# Patient Record
Sex: MALE | Race: WHITE | NOT HISPANIC OR LATINO | Employment: FULL TIME | URBAN - METROPOLITAN AREA
[De-identification: names, ages, dates, MRNs, and addresses within clinical notes are randomized per-mention and may not be internally consistent; named-entity substitution may affect disease eponyms.]

---

## 2017-06-14 ENCOUNTER — CLINICAL SUPPORT (OUTPATIENT)
Dept: AUDIOLOGY | Facility: CLINIC | Age: 67
End: 2017-06-14
Payer: COMMERCIAL

## 2017-06-14 ENCOUNTER — OFFICE VISIT (OUTPATIENT)
Dept: OTOLARYNGOLOGY | Facility: CLINIC | Age: 67
End: 2017-06-14
Payer: COMMERCIAL

## 2017-06-14 VITALS — WEIGHT: 169.75 LBS | HEIGHT: 69 IN | BODY MASS INDEX: 25.14 KG/M2

## 2017-06-14 DIAGNOSIS — H93.3X2: Primary | ICD-10-CM

## 2017-06-14 PROCEDURE — 99205 OFFICE O/P NEW HI 60 MIN: CPT | Mod: S$GLB,,, | Performed by: OTOLARYNGOLOGY

## 2017-06-14 PROCEDURE — 92557 COMPREHENSIVE HEARING TEST: CPT | Mod: S$GLB,,, | Performed by: AUDIOLOGIST

## 2017-06-14 PROCEDURE — 99999 PR PBB SHADOW E&M-NEW PATIENT-LVL I: CPT | Mod: PBBFAC,,,

## 2017-06-14 PROCEDURE — 99999 PR PBB SHADOW E&M-EST. PATIENT-LVL III: CPT | Mod: PBBFAC,,, | Performed by: OTOLARYNGOLOGY

## 2017-06-14 NOTE — LETTER
June 14, 2017      Onofre Willis MD  [address] [facility] [address]  [facility] [address]           Ajay kailey - Otorhinolaryngology  [address]  [facility] [address]  Phone: [phone]  Fax: [phone]          Patient: Zka Larry   MR Number: [id number]   YOB: 1950   Date of Visit: 6/14/2017       Dear Dr. Onofre Willis:    Thank you for referring Zak Larry to me for evaluation. Attached you will find relevant portions of my assessment and plan of care.    If you have questions, please do not hesitate to call me. I look forward to following Zak Larry along with you.    Sincerely,    Juan M Pride MD    Enclosure  CC:  No Recipients    If you would like to receive this communication electronically, please contact [email or web address] or [phone] to request more information on [employer] access.    For providers and/or their staff who would like to refer a patient to [facility], please contact us through our one-stop-shop provider referral line, [facility], at [phone].    If you feel you have received this communication in error or would no longer like to receive these types of communications, please e-mail [email or web address]

## 2017-06-14 NOTE — PROGRESS NOTES
Subjective:       Patient ID: Zak Larry is a 67 y.o. male.    Chief Complaint: Acoustic Neuroma    66 yo male who has had left sided hearing loss for a long time that has gradually worsened over time. He went another neurootologist and was found to have an acoustic neuroma on the left side 2.5 x2.2x2.2 cm on the brainstem that has widened the IAC. Pt has no facial nerve symptoms and this is his greatest concern. His MRI will not open at this time      Hearing Loss:   Chronicity:  Chronic  Onset:  More than 1 year ago  Progression since onset:  Gradually worsening  Severity:  Moderate  Hearing loss characteristics:  Muffled, trouble hearing TV, difficult on telephone, impaired select discrimination and severe   Associated symptoms: tinnitus.  No dizziness, no vertigo, no ear pain and no headaches.  Aggravated by:  Nothing  Risk factors for lung disease:  Family history of deafness  Treatments tried:  NothingNo dizziness.    Review of Systems   Constitutional: Negative.    HENT: Positive for hearing loss and tinnitus. Negative for ear discharge and ear pain.    Eyes: Negative for visual disturbance.   Respiratory: Negative for shortness of breath.    Cardiovascular: Negative for chest pain.   Gastrointestinal: Negative.    Endocrine: Negative.    Genitourinary: Negative.    Musculoskeletal: Negative.    Skin: Negative.    Allergic/Immunologic: Negative.    Neurological: Negative for dizziness, vertigo and headaches.   Hematological: Negative.    Psychiatric/Behavioral: Negative.        Past Medical History:   Diagnosis Date    Cancer     SKIN    Hearing loss        Past Surgical History:   Procedure Laterality Date    BONE SPUR REMOVAL Left 2005    FOOT    HEMORRHOID SURGERY      TONSILLECTOMY         Objective:      Physical Exam   Constitutional: He is oriented to person, place, and time. Vital signs are normal. He appears well-developed and well-nourished.  Non-toxic appearance. He does not have a  sickly appearance. No distress.   HENT:   Head: Normocephalic and atraumatic.       Right Ear: Tympanic membrane, external ear and ear canal normal. No tenderness. Tympanic membrane is not injected. Decreased hearing is noted.   Left Ear: Tympanic membrane, external ear and ear canal normal. No tenderness. Tympanic membrane is not injected. Decreased hearing is noted.   Nose: No mucosal edema, rhinorrhea or septal deviation.   Eyes: EOM and lids are normal. Pupils are equal, round, and reactive to light.   Neck: Normal range of motion. No thyroid mass present.   Cardiovascular: Normal rate, regular rhythm and normal heart sounds.    Pulmonary/Chest: Effort normal and breath sounds normal.   Lymphadenopathy:     He has no cervical adenopathy.   Neurological: He is alert and oriented to person, place, and time.   Skin: Skin is warm and dry.   Nursing note and vitals reviewed.            Assessment:       1. Acoustic neuroma syndrome, left        Plan:       Zak was seen today for acoustic neuroma.    Diagnoses and all orders for this visit:    Acoustic neuroma syndrome, left  -     Ambulatory referral to Audiology      Will attempt to open MRI with radiology, if not will need report versus new scan in a few months  Discussed options of surgery, radiosurgery and close monitoring.   Will f/u with patient after MRI reviewed

## 2017-10-03 ENCOUNTER — HISTORICAL (OUTPATIENT)
Dept: ADMINISTRATIVE | Facility: HOSPITAL | Age: 67
End: 2017-10-03

## 2017-10-03 LAB
ABS NEUT (OLG): 3.51 X10(3)/MCL (ref 2.1–9.2)
ALBUMIN SERPL-MCNC: 4 GM/DL (ref 3.4–5)
ALBUMIN/GLOB SERPL: 1.2 RATIO (ref 1.1–2)
ALP SERPL-CCNC: 86 UNIT/L (ref 50–136)
ALT SERPL-CCNC: 30 UNIT/L (ref 12–78)
AST SERPL-CCNC: 27 UNIT/L (ref 15–37)
BASOPHILS # BLD AUTO: 0 X10(3)/MCL (ref 0–0.2)
BASOPHILS NFR BLD AUTO: 1 %
BILIRUB SERPL-MCNC: 0.7 MG/DL (ref 0.2–1)
BILIRUBIN DIRECT+TOT PNL SERPL-MCNC: 0.2 MG/DL (ref 0–0.5)
BILIRUBIN DIRECT+TOT PNL SERPL-MCNC: 0.5 MG/DL (ref 0–0.8)
BUN SERPL-MCNC: 17 MG/DL (ref 7–18)
CALCIUM SERPL-MCNC: 9.2 MG/DL (ref 8.5–10.1)
CHLORIDE SERPL-SCNC: 104 MMOL/L (ref 98–107)
CO2 SERPL-SCNC: 31 MMOL/L (ref 21–32)
CREAT SERPL-MCNC: 0.83 MG/DL (ref 0.7–1.3)
EOSINOPHIL # BLD AUTO: 0.1 X10(3)/MCL (ref 0–0.9)
EOSINOPHIL NFR BLD AUTO: 2 %
ERYTHROCYTE [DISTWIDTH] IN BLOOD BY AUTOMATED COUNT: 12.1 % (ref 11.5–17)
GLOBULIN SER-MCNC: 3.2 GM/DL (ref 2.4–3.5)
GLUCOSE SERPL-MCNC: 100 MG/DL (ref 74–106)
HCT VFR BLD AUTO: 43.5 % (ref 42–52)
HGB BLD-MCNC: 14.6 GM/DL (ref 14–18)
LYMPHOCYTES # BLD AUTO: 1.9 X10(3)/MCL (ref 0.6–4.6)
LYMPHOCYTES NFR BLD AUTO: 31 %
MCH RBC QN AUTO: 30.4 PG (ref 27–31)
MCHC RBC AUTO-ENTMCNC: 33.6 GM/DL (ref 33–36)
MCV RBC AUTO: 90.6 FL (ref 80–94)
MONOCYTES # BLD AUTO: 0.5 X10(3)/MCL (ref 0.1–1.3)
MONOCYTES NFR BLD AUTO: 8 %
NEUTROPHILS # BLD AUTO: 3.51 X10(3)/MCL (ref 2.1–9.2)
NEUTROPHILS NFR BLD AUTO: 58 %
PLATELET # BLD AUTO: 193 X10(3)/MCL (ref 130–400)
PMV BLD AUTO: 9.9 FL (ref 9.4–12.4)
POTASSIUM SERPL-SCNC: 4.5 MMOL/L (ref 3.5–5.1)
PROT SERPL-MCNC: 7.2 GM/DL (ref 6.4–8.2)
RBC # BLD AUTO: 4.8 X10(6)/MCL (ref 4.7–6.1)
SODIUM SERPL-SCNC: 140 MMOL/L (ref 136–145)
WBC # SPEC AUTO: 6.1 X10(3)/MCL (ref 4.5–11.5)

## 2020-06-18 ENCOUNTER — HISTORICAL (OUTPATIENT)
Dept: ADMINISTRATIVE | Facility: HOSPITAL | Age: 70
End: 2020-06-18

## 2021-06-30 PROBLEM — E03.9 HYPOTHYROIDISM: Status: ACTIVE | Noted: 2021-06-30

## 2021-06-30 PROBLEM — E78.5 DYSLIPIDEMIA: Status: ACTIVE | Noted: 2021-06-30

## 2021-07-23 PROBLEM — I08.0 MITRAL VALVE INSUFFICIENCY AND AORTIC VALVE INSUFFICIENCY: Status: ACTIVE | Noted: 2021-07-23

## 2021-07-23 PROBLEM — I65.23 BILATERAL CAROTID ARTERY STENOSIS: Status: ACTIVE | Noted: 2021-07-23

## 2021-07-23 PROBLEM — Z79.899 ENCOUNTER FOR LONG-TERM (CURRENT) USE OF OTHER MEDICATIONS: Status: ACTIVE | Noted: 2021-07-23

## 2021-08-11 PROBLEM — E53.8 FOLATE DEFICIENCY: Status: ACTIVE | Noted: 2021-08-11

## 2021-09-24 DIAGNOSIS — K40.90 NON-RECURRENT UNILATERAL INGUINAL HERNIA WITHOUT OBSTRUCTION OR GANGRENE: Primary | ICD-10-CM

## 2021-09-24 RX ORDER — LIDOCAINE HYDROCHLORIDE 10 MG/ML
1 INJECTION, SOLUTION EPIDURAL; INFILTRATION; INTRACAUDAL; PERINEURAL ONCE
Status: CANCELLED | OUTPATIENT
Start: 2021-09-24 | End: 2021-09-24

## 2021-09-24 RX ORDER — SODIUM CHLORIDE 0.9 % (FLUSH) 0.9 %
10 SYRINGE (ML) INJECTION
Status: CANCELLED | OUTPATIENT
Start: 2021-09-24

## 2021-09-24 RX ORDER — SODIUM CHLORIDE 9 MG/ML
INJECTION, SOLUTION INTRAVENOUS CONTINUOUS
Status: CANCELLED | OUTPATIENT
Start: 2021-10-04

## 2021-09-24 RX ORDER — CEFAZOLIN SODIUM 2 G/50ML
2 SOLUTION INTRAVENOUS
Status: CANCELLED | OUTPATIENT
Start: 2021-10-04

## 2021-09-29 ENCOUNTER — HOSPITAL ENCOUNTER (OUTPATIENT)
Dept: PREADMISSION TESTING | Facility: HOSPITAL | Age: 71
Discharge: HOME | End: 2021-09-29
Attending: SURGERY
Payer: COMMERCIAL

## 2021-09-29 VITALS — HEIGHT: 69 IN | WEIGHT: 155 LBS | BODY MASS INDEX: 22.96 KG/M2

## 2021-09-29 RX ORDER — ASPIRIN 81 MG/1
81 TABLET ORAL DAILY
COMMUNITY

## 2021-09-29 RX ORDER — OMEPRAZOLE 40 MG/1
40 CAPSULE, DELAYED RELEASE ORAL DAILY
COMMUNITY

## 2021-09-30 ENCOUNTER — ANESTHESIA EVENT (OUTPATIENT)
Dept: SURGERY | Facility: HOSPITAL | Age: 71
End: 2021-09-30
Payer: COMMERCIAL

## 2021-10-01 ENCOUNTER — HOSPITAL ENCOUNTER (OUTPATIENT)
Dept: PREADMISSION TESTING | Facility: HOSPITAL | Age: 71
Discharge: HOME | End: 2021-10-01
Attending: SURGERY
Payer: COMMERCIAL

## 2021-10-01 DIAGNOSIS — Z01.818 PRE-OP TESTING: ICD-10-CM

## 2021-10-01 DIAGNOSIS — K40.90 NON-RECURRENT UNILATERAL INGUINAL HERNIA WITHOUT OBSTRUCTION OR GANGRENE: ICD-10-CM

## 2021-10-01 LAB — SARS-COV-2 RNA RESP QL NAA+PROBE: NOT DETECTED

## 2021-10-01 PROCEDURE — U0002 COVID-19 LAB TEST NON-CDC: HCPCS | Performed by: SURGERY

## 2021-10-03 PROBLEM — K40.90 INGUINAL HERNIA: Chronic | Status: ACTIVE | Noted: 2021-09-01

## 2021-10-04 ENCOUNTER — HOSPITAL ENCOUNTER (OUTPATIENT)
Facility: HOSPITAL | Age: 71
Discharge: HOME | End: 2021-10-04
Attending: SURGERY | Admitting: SURGERY
Payer: COMMERCIAL

## 2021-10-04 ENCOUNTER — ANESTHESIA (OUTPATIENT)
Dept: SURGERY | Facility: HOSPITAL | Age: 71
End: 2021-10-04
Payer: COMMERCIAL

## 2021-10-04 VITALS
DIASTOLIC BLOOD PRESSURE: 59 MMHG | SYSTOLIC BLOOD PRESSURE: 127 MMHG | RESPIRATION RATE: 20 BRPM | TEMPERATURE: 98 F | OXYGEN SATURATION: 99 % | HEART RATE: 57 BPM

## 2021-10-04 DIAGNOSIS — K40.90 NON-RECURRENT UNILATERAL INGUINAL HERNIA WITHOUT OBSTRUCTION OR GANGRENE: Primary | ICD-10-CM

## 2021-10-04 LAB
ALBUMIN SERPL BCP-MCNC: 3.6 G/DL (ref 3.5–5.2)
ALP SERPL-CCNC: 79 U/L (ref 55–135)
ALT SERPL W/O P-5'-P-CCNC: 25 U/L (ref 10–44)
ANION GAP SERPL CALC-SCNC: 0 MMOL/L (ref 8–16)
AST SERPL-CCNC: 24 U/L (ref 10–40)
BILIRUB SERPL-MCNC: 0.7 MG/DL (ref 0.1–1)
BILIRUB UR QL STRIP: NEGATIVE
BUN SERPL-MCNC: 16 MG/DL (ref 8–23)
CALCIUM SERPL-MCNC: 8.9 MG/DL (ref 8.7–10.5)
CHLORIDE SERPL-SCNC: 110 MMOL/L (ref 95–110)
CLARITY UR: CLEAR
CO2 SERPL-SCNC: 31 MMOL/L (ref 23–29)
COLOR UR: YELLOW
CREAT SERPL-MCNC: 0.9 MG/DL (ref 0.5–1.4)
ERYTHROCYTE [DISTWIDTH] IN BLOOD BY AUTOMATED COUNT: 12 % (ref 11.5–14.5)
EST. GFR  (AFRICAN AMERICAN): >60 ML/MIN/1.73 M^2
EST. GFR  (NON AFRICAN AMERICAN): >60 ML/MIN/1.73 M^2
GLUCOSE SERPL-MCNC: 106 MG/DL (ref 70–110)
GLUCOSE UR QL STRIP: NEGATIVE
HCT VFR BLD AUTO: 39.2 % (ref 40–54)
HGB BLD-MCNC: 13.7 G/DL (ref 14–18)
HGB UR QL STRIP: NEGATIVE
KETONES UR QL STRIP: NEGATIVE
LEUKOCYTE ESTERASE UR QL STRIP: NEGATIVE
MCH RBC QN AUTO: 31.1 PG (ref 27–31)
MCHC RBC AUTO-ENTMCNC: 34.9 G/DL (ref 32–36)
MCV RBC AUTO: 89 FL (ref 82–98)
NITRITE UR QL STRIP: NEGATIVE
PH UR STRIP: 8 [PH] (ref 5–8)
PLATELET # BLD AUTO: 167 K/UL (ref 150–450)
PMV BLD AUTO: 10.6 FL (ref 9.2–12.9)
POTASSIUM SERPL-SCNC: 4.9 MMOL/L (ref 3.5–5.1)
POTASSIUM SERPL-SCNC: 5.7 MMOL/L (ref 3.5–5.1)
PROT SERPL-MCNC: 7 G/DL (ref 6–8.4)
PROT UR QL STRIP: NEGATIVE
RBC # BLD AUTO: 4.4 M/UL (ref 4.6–6.2)
SODIUM SERPL-SCNC: 141 MMOL/L (ref 136–145)
SP GR UR STRIP: 1.01 (ref 1–1.03)
URN SPEC COLLECT METH UR: NORMAL
UROBILINOGEN UR STRIP-ACNC: NEGATIVE EU/DL
WBC # BLD AUTO: 5.43 K/UL (ref 3.9–12.7)

## 2021-10-04 PROCEDURE — 25000003 PHARM REV CODE 250: Performed by: SURGERY

## 2021-10-04 PROCEDURE — 80053 COMPREHEN METABOLIC PANEL: CPT | Performed by: SURGERY

## 2021-10-04 PROCEDURE — 63600175 PHARM REV CODE 636 W HCPCS: Performed by: SURGERY

## 2021-10-04 PROCEDURE — 85027 COMPLETE CBC AUTOMATED: CPT | Performed by: SURGERY

## 2021-10-04 PROCEDURE — 37000008 HC ANESTHESIA 1ST 15 MINUTES: Performed by: SURGERY

## 2021-10-04 PROCEDURE — 71000015 HC POSTOP RECOV 1ST HR: Performed by: SURGERY

## 2021-10-04 PROCEDURE — 36415 COLL VENOUS BLD VENIPUNCTURE: CPT | Performed by: SURGERY

## 2021-10-04 PROCEDURE — 27201423 OPTIME MED/SURG SUP & DEVICES STERILE SUPPLY: Performed by: SURGERY

## 2021-10-04 PROCEDURE — 25000003 PHARM REV CODE 250: Performed by: NURSE ANESTHETIST, CERTIFIED REGISTERED

## 2021-10-04 PROCEDURE — 63600175 PHARM REV CODE 636 W HCPCS: Performed by: NURSE ANESTHETIST, CERTIFIED REGISTERED

## 2021-10-04 PROCEDURE — 71000016 HC POSTOP RECOV ADDL HR: Performed by: SURGERY

## 2021-10-04 PROCEDURE — 37000009 HC ANESTHESIA EA ADD 15 MINS: Performed by: SURGERY

## 2021-10-04 PROCEDURE — D9220A PRA ANESTHESIA: Mod: ,,, | Performed by: NURSE ANESTHETIST, CERTIFIED REGISTERED

## 2021-10-04 PROCEDURE — 36000706: Performed by: SURGERY

## 2021-10-04 PROCEDURE — 63600175 PHARM REV CODE 636 W HCPCS: Performed by: ANESTHESIOLOGY

## 2021-10-04 PROCEDURE — 71000033 HC RECOVERY, INTIAL HOUR: Performed by: SURGERY

## 2021-10-04 PROCEDURE — 84132 ASSAY OF SERUM POTASSIUM: CPT | Mod: 91 | Performed by: SURGERY

## 2021-10-04 PROCEDURE — 81003 URINALYSIS AUTO W/O SCOPE: CPT | Performed by: SURGERY

## 2021-10-04 PROCEDURE — D9220A PRA ANESTHESIA: ICD-10-PCS | Mod: ,,, | Performed by: NURSE ANESTHETIST, CERTIFIED REGISTERED

## 2021-10-04 PROCEDURE — C1781 MESH (IMPLANTABLE): HCPCS | Performed by: SURGERY

## 2021-10-04 PROCEDURE — 36000707: Performed by: SURGERY

## 2021-10-04 PROCEDURE — C1729 CATH, DRAINAGE: HCPCS | Performed by: SURGERY

## 2021-10-04 PROCEDURE — 25000003 PHARM REV CODE 250: Performed by: ANESTHESIOLOGY

## 2021-10-04 DEVICE — MESH PROGRIP RIGHT: Type: IMPLANTABLE DEVICE | Site: INGUINAL | Status: FUNCTIONAL

## 2021-10-04 RX ORDER — NEOSTIGMINE METHYLSULFATE 5 MG/5 ML
SYRINGE (ML) INTRAVENOUS
Status: DISCONTINUED | OUTPATIENT
Start: 2021-10-04 | End: 2021-10-04

## 2021-10-04 RX ORDER — ONDANSETRON 2 MG/ML
8 INJECTION INTRAMUSCULAR; INTRAVENOUS ONCE
Status: COMPLETED | OUTPATIENT
Start: 2021-10-04 | End: 2021-10-04

## 2021-10-04 RX ORDER — DIPHENHYDRAMINE HYDROCHLORIDE 50 MG/ML
25 INJECTION INTRAMUSCULAR; INTRAVENOUS EVERY 6 HOURS PRN
Status: DISCONTINUED | OUTPATIENT
Start: 2021-10-04 | End: 2021-10-04 | Stop reason: HOSPADM

## 2021-10-04 RX ORDER — MIDAZOLAM HYDROCHLORIDE 1 MG/ML
INJECTION INTRAMUSCULAR; INTRAVENOUS
Status: DISCONTINUED | OUTPATIENT
Start: 2021-10-04 | End: 2021-10-04

## 2021-10-04 RX ORDER — HYDROCODONE BITARTRATE AND ACETAMINOPHEN 7.5; 325 MG/1; MG/1
1 TABLET ORAL EVERY 6 HOURS PRN
Status: DISCONTINUED | OUTPATIENT
Start: 2021-10-04 | End: 2021-10-04 | Stop reason: HOSPADM

## 2021-10-04 RX ORDER — LIDOCAINE HYDROCHLORIDE 10 MG/ML
1 INJECTION, SOLUTION EPIDURAL; INFILTRATION; INTRACAUDAL; PERINEURAL ONCE
Status: DISCONTINUED | OUTPATIENT
Start: 2021-10-04 | End: 2021-10-04 | Stop reason: HOSPADM

## 2021-10-04 RX ORDER — ONDANSETRON 2 MG/ML
4 INJECTION INTRAMUSCULAR; INTRAVENOUS DAILY PRN
Status: DISCONTINUED | OUTPATIENT
Start: 2021-10-04 | End: 2021-10-04 | Stop reason: HOSPADM

## 2021-10-04 RX ORDER — EPHEDRINE SULFATE 50 MG/ML
INJECTION, SOLUTION INTRAVENOUS
Status: DISCONTINUED | OUTPATIENT
Start: 2021-10-04 | End: 2021-10-04

## 2021-10-04 RX ORDER — HYDROCODONE BITARTRATE AND ACETAMINOPHEN 7.5; 325 MG/1; MG/1
1 TABLET ORAL EVERY 4 HOURS PRN
Qty: 15 TABLET | Refills: 0 | Status: SHIPPED | OUTPATIENT
Start: 2021-10-04 | End: 2022-09-14

## 2021-10-04 RX ORDER — BUPIVACAINE HYDROCHLORIDE AND EPINEPHRINE 2.5; 5 MG/ML; UG/ML
INJECTION, SOLUTION EPIDURAL; INFILTRATION; INTRACAUDAL; PERINEURAL
Status: DISCONTINUED | OUTPATIENT
Start: 2021-10-04 | End: 2021-10-04 | Stop reason: HOSPADM

## 2021-10-04 RX ORDER — ROCURONIUM BROMIDE 10 MG/ML
INJECTION, SOLUTION INTRAVENOUS
Status: DISCONTINUED | OUTPATIENT
Start: 2021-10-04 | End: 2021-10-04

## 2021-10-04 RX ORDER — AMIKACIN SULFATE 250 MG/ML
INJECTION, SOLUTION INTRAMUSCULAR; INTRAVENOUS
Status: DISCONTINUED | OUTPATIENT
Start: 2021-10-04 | End: 2021-10-04 | Stop reason: HOSPADM

## 2021-10-04 RX ORDER — FENTANYL CITRATE 50 UG/ML
INJECTION, SOLUTION INTRAMUSCULAR; INTRAVENOUS
Status: DISCONTINUED | OUTPATIENT
Start: 2021-10-04 | End: 2021-10-04

## 2021-10-04 RX ORDER — SODIUM CHLORIDE 9 MG/ML
INJECTION, SOLUTION INTRAVENOUS CONTINUOUS
Status: DISCONTINUED | OUTPATIENT
Start: 2021-10-04 | End: 2021-10-04 | Stop reason: HOSPADM

## 2021-10-04 RX ORDER — PROPOFOL 10 MG/ML
VIAL (ML) INTRAVENOUS
Status: DISCONTINUED | OUTPATIENT
Start: 2021-10-04 | End: 2021-10-04

## 2021-10-04 RX ORDER — CEFAZOLIN SODIUM 2 G/50ML
2 SOLUTION INTRAVENOUS
Status: DISCONTINUED | OUTPATIENT
Start: 2021-10-04 | End: 2021-10-04 | Stop reason: HOSPADM

## 2021-10-04 RX ORDER — AMOXICILLIN AND CLAVULANATE POTASSIUM 875; 125 MG/1; MG/1
1 TABLET, FILM COATED ORAL EVERY 12 HOURS
Qty: 7 TABLET | Refills: 0 | Status: SHIPPED | OUTPATIENT
Start: 2021-10-04 | End: 2022-02-28

## 2021-10-04 RX ORDER — LIDOCAINE HYDROCHLORIDE 20 MG/ML
INJECTION, SOLUTION EPIDURAL; INFILTRATION; INTRACAUDAL; PERINEURAL
Status: DISCONTINUED | OUTPATIENT
Start: 2021-10-04 | End: 2021-10-04

## 2021-10-04 RX ORDER — MEPERIDINE HYDROCHLORIDE 25 MG/ML
12.5 INJECTION INTRAMUSCULAR; INTRAVENOUS; SUBCUTANEOUS EVERY 10 MIN PRN
Status: DISPENSED | OUTPATIENT
Start: 2021-10-04 | End: 2021-10-04

## 2021-10-04 RX ORDER — SODIUM CHLORIDE 0.9 % (FLUSH) 0.9 %
10 SYRINGE (ML) INJECTION
Status: DISCONTINUED | OUTPATIENT
Start: 2021-10-04 | End: 2021-10-04 | Stop reason: HOSPADM

## 2021-10-04 RX ADMIN — CEFAZOLIN SODIUM 1 G: 1 INJECTION, POWDER, FOR SOLUTION INTRAMUSCULAR; INTRAVENOUS at 03:10

## 2021-10-04 RX ADMIN — FENTANYL CITRATE 50 MCG: 50 INJECTION INTRAMUSCULAR; INTRAVENOUS at 01:10

## 2021-10-04 RX ADMIN — FENTANYL CITRATE 100 MCG: 50 INJECTION INTRAMUSCULAR; INTRAVENOUS at 12:10

## 2021-10-04 RX ADMIN — LIDOCAINE HYDROCHLORIDE 50 MG: 20 INJECTION, SOLUTION EPIDURAL; INFILTRATION; INTRACAUDAL; PERINEURAL at 12:10

## 2021-10-04 RX ADMIN — ONDANSETRON 8 MG: 2 INJECTION INTRAMUSCULAR; INTRAVENOUS at 03:10

## 2021-10-04 RX ADMIN — FENTANYL CITRATE 50 MCG: 50 INJECTION INTRAMUSCULAR; INTRAVENOUS at 12:10

## 2021-10-04 RX ADMIN — EPHEDRINE SULFATE 5 MG: 50 INJECTION, SOLUTION INTRAVENOUS at 01:10

## 2021-10-04 RX ADMIN — EPHEDRINE SULFATE 10 MG: 50 INJECTION, SOLUTION INTRAVENOUS at 01:10

## 2021-10-04 RX ADMIN — EPHEDRINE SULFATE 10 MG: 50 INJECTION, SOLUTION INTRAVENOUS at 12:10

## 2021-10-04 RX ADMIN — MIDAZOLAM 2.5 MG: 1 INJECTION INTRAMUSCULAR; INTRAVENOUS at 12:10

## 2021-10-04 RX ADMIN — GLYCOPYRROLATE 0.4 MG: 0.2 INJECTION, SOLUTION INTRAMUSCULAR; INTRAVITREAL at 01:10

## 2021-10-04 RX ADMIN — ROCURONIUM BROMIDE 30 MG: 10 INJECTION, SOLUTION INTRAVENOUS at 12:10

## 2021-10-04 RX ADMIN — EPHEDRINE SULFATE 15 MG: 50 INJECTION, SOLUTION INTRAVENOUS at 12:10

## 2021-10-04 RX ADMIN — Medication 150 MG: at 12:10

## 2021-10-04 RX ADMIN — HYDROCODONE BITARTRATE AND ACETAMINOPHEN 1 TABLET: 7.5; 325 TABLET ORAL at 03:10

## 2021-10-04 RX ADMIN — SODIUM CHLORIDE: 0.9 INJECTION, SOLUTION INTRAVENOUS at 10:10

## 2021-10-04 RX ADMIN — MEPERIDINE HYDROCHLORIDE 12.5 MG: 25 INJECTION, SOLUTION INTRAMUSCULAR; INTRAVENOUS; SUBCUTANEOUS at 02:10

## 2021-10-04 RX ADMIN — CEFAZOLIN SODIUM 2 G: 2 SOLUTION INTRAVENOUS at 11:10

## 2021-10-04 RX ADMIN — ONDANSETRON 8 MG: 2 INJECTION INTRAMUSCULAR; INTRAVENOUS at 01:10

## 2021-10-04 RX ADMIN — SODIUM CHLORIDE: 0.9 INJECTION, SOLUTION INTRAVENOUS at 02:10

## 2021-10-04 RX ADMIN — SODIUM CHLORIDE: 0.9 INJECTION, SOLUTION INTRAVENOUS at 12:10

## 2021-10-04 RX ADMIN — Medication 3 MG: at 01:10

## 2021-10-11 ENCOUNTER — HOSPITAL ENCOUNTER (EMERGENCY)
Facility: HOSPITAL | Age: 71
Discharge: HOME | End: 2021-10-11
Attending: EMERGENCY MEDICINE
Payer: COMMERCIAL

## 2021-10-11 VITALS
RESPIRATION RATE: 18 BRPM | HEART RATE: 65 BPM | TEMPERATURE: 98 F | SYSTOLIC BLOOD PRESSURE: 155 MMHG | HEIGHT: 69 IN | OXYGEN SATURATION: 99 % | WEIGHT: 155 LBS | DIASTOLIC BLOOD PRESSURE: 77 MMHG | BODY MASS INDEX: 22.96 KG/M2

## 2021-10-11 DIAGNOSIS — N99.89 POSTOPERATIVE URINARY RETENTION: Primary | ICD-10-CM

## 2021-10-11 DIAGNOSIS — R33.8 POSTOPERATIVE URINARY RETENTION: Primary | ICD-10-CM

## 2021-10-11 LAB
BACTERIA #/AREA URNS HPF: NEGATIVE /HPF
BILIRUB UR QL STRIP: NEGATIVE
CLARITY UR: CLEAR
COLOR UR: YELLOW
GLUCOSE UR QL STRIP: NEGATIVE
HGB UR QL STRIP: NEGATIVE
HYALINE CASTS #/AREA URNS LPF: 0 /LPF
KETONES UR QL STRIP: NEGATIVE
LEUKOCYTE ESTERASE UR QL STRIP: ABNORMAL
MICROSCOPIC COMMENT: ABNORMAL
NITRITE UR QL STRIP: NEGATIVE
PH UR STRIP: 7 [PH] (ref 5–8)
PROT UR QL STRIP: NEGATIVE
RBC #/AREA URNS HPF: 2 /HPF (ref 0–4)
SP GR UR STRIP: 1.02 (ref 1–1.03)
SQUAMOUS #/AREA URNS HPF: 1 /HPF
URN SPEC COLLECT METH UR: ABNORMAL
UROBILINOGEN UR STRIP-ACNC: NEGATIVE EU/DL
WBC #/AREA URNS HPF: 11 /HPF (ref 0–5)

## 2021-10-11 PROCEDURE — 51702 INSERT TEMP BLADDER CATH: CPT

## 2021-10-11 PROCEDURE — 81000 URINALYSIS NONAUTO W/SCOPE: CPT | Performed by: EMERGENCY MEDICINE

## 2021-10-11 PROCEDURE — 87086 URINE CULTURE/COLONY COUNT: CPT | Performed by: EMERGENCY MEDICINE

## 2021-10-11 PROCEDURE — 99283 EMERGENCY DEPT VISIT LOW MDM: CPT | Mod: 25

## 2021-10-12 LAB — BACTERIA UR CULT: NO GROWTH

## 2021-10-15 ENCOUNTER — HOSPITAL ENCOUNTER (EMERGENCY)
Facility: HOSPITAL | Age: 71
Discharge: HOME | End: 2021-10-15
Attending: STUDENT IN AN ORGANIZED HEALTH CARE EDUCATION/TRAINING PROGRAM
Payer: COMMERCIAL

## 2021-10-15 VITALS
HEART RATE: 96 BPM | TEMPERATURE: 98 F | HEIGHT: 69 IN | DIASTOLIC BLOOD PRESSURE: 80 MMHG | RESPIRATION RATE: 18 BRPM | SYSTOLIC BLOOD PRESSURE: 158 MMHG | BODY MASS INDEX: 22.81 KG/M2 | WEIGHT: 154 LBS | OXYGEN SATURATION: 99 %

## 2021-10-15 DIAGNOSIS — Z46.6 ENCOUNTER FOR FOLEY CATHETER REMOVAL: Primary | ICD-10-CM

## 2021-10-15 PROCEDURE — 25000003 PHARM REV CODE 250: Performed by: CLINICAL NURSE SPECIALIST

## 2021-10-15 PROCEDURE — 99283 EMERGENCY DEPT VISIT LOW MDM: CPT | Mod: 25

## 2021-10-15 RX ORDER — TAMSULOSIN HYDROCHLORIDE 0.4 MG/1
0.4 CAPSULE ORAL
Status: COMPLETED | OUTPATIENT
Start: 2021-10-15 | End: 2021-10-15

## 2021-10-15 RX ORDER — TAMSULOSIN HYDROCHLORIDE 0.4 MG/1
0.4 CAPSULE ORAL DAILY
Qty: 10 CAPSULE | Refills: 0 | Status: SHIPPED | OUTPATIENT
Start: 2021-10-15 | End: 2024-02-19

## 2021-10-15 RX ADMIN — TAMSULOSIN HYDROCHLORIDE 0.4 MG: 0.4 CAPSULE ORAL at 02:10

## 2021-10-16 ENCOUNTER — HOSPITAL ENCOUNTER (EMERGENCY)
Facility: HOSPITAL | Age: 71
Discharge: HOME | End: 2021-10-16
Attending: STUDENT IN AN ORGANIZED HEALTH CARE EDUCATION/TRAINING PROGRAM
Payer: COMMERCIAL

## 2021-10-16 VITALS
TEMPERATURE: 98 F | OXYGEN SATURATION: 99 % | BODY MASS INDEX: 22.51 KG/M2 | HEIGHT: 69 IN | RESPIRATION RATE: 14 BRPM | HEART RATE: 62 BPM | WEIGHT: 152 LBS | DIASTOLIC BLOOD PRESSURE: 72 MMHG | SYSTOLIC BLOOD PRESSURE: 137 MMHG

## 2021-10-16 DIAGNOSIS — R33.9 URINARY RETENTION: Primary | ICD-10-CM

## 2021-10-16 PROCEDURE — 99283 EMERGENCY DEPT VISIT LOW MDM: CPT

## 2021-12-09 DIAGNOSIS — K21.9 GASTROESOPHAGEAL REFLUX DISEASE: Primary | ICD-10-CM

## 2021-12-13 ENCOUNTER — HOSPITAL ENCOUNTER (OUTPATIENT)
Dept: RADIOLOGY | Facility: HOSPITAL | Age: 71
Discharge: HOME | End: 2021-12-13
Payer: COMMERCIAL

## 2021-12-13 DIAGNOSIS — K21.9 GASTROESOPHAGEAL REFLUX DISEASE: ICD-10-CM

## 2021-12-13 PROCEDURE — 77080 DXA BONE DENSITY AXIAL: CPT | Mod: TC

## 2023-10-06 ENCOUNTER — LAB VISIT (OUTPATIENT)
Dept: LAB | Facility: HOSPITAL | Age: 73
End: 2023-10-06
Attending: INTERNAL MEDICINE
Payer: MEDICARE

## 2023-10-06 DIAGNOSIS — I08.0 MITRAL VALVE INSUFFICIENCY AND AORTIC VALVE INSUFFICIENCY: ICD-10-CM

## 2023-10-06 DIAGNOSIS — Z79.899 ENCOUNTER FOR LONG-TERM (CURRENT) USE OF OTHER MEDICATIONS: ICD-10-CM

## 2023-10-06 DIAGNOSIS — E78.5 DYSLIPIDEMIA: ICD-10-CM

## 2023-10-06 DIAGNOSIS — I34.0 MITRAL VALVE INSUFFICIENCY, UNSPECIFIED ETIOLOGY: ICD-10-CM

## 2023-10-06 DIAGNOSIS — I65.23 BILATERAL CAROTID ARTERY STENOSIS: ICD-10-CM

## 2023-10-06 DIAGNOSIS — E53.8 FOLATE DEFICIENCY: ICD-10-CM

## 2023-10-06 DIAGNOSIS — E03.9 HYPOTHYROIDISM, UNSPECIFIED TYPE: ICD-10-CM

## 2023-10-06 LAB
ALBUMIN SERPL BCP-MCNC: 3.7 G/DL (ref 3.5–5.2)
ALBUMIN/CREAT UR: 7.3 UG/MG (ref 0–30)
ALP SERPL-CCNC: 70 U/L (ref 55–135)
ALT SERPL W/O P-5'-P-CCNC: 38 U/L (ref 10–44)
ANION GAP SERPL CALC-SCNC: 3 MMOL/L (ref 3–11)
AST SERPL-CCNC: 38 U/L (ref 10–40)
BILIRUB SERPL-MCNC: 0.8 MG/DL (ref 0.1–1)
BUN SERPL-MCNC: 24 MG/DL (ref 8–23)
CALCIUM SERPL-MCNC: 8.3 MG/DL (ref 8.7–10.5)
CHLORIDE SERPL-SCNC: 110 MMOL/L (ref 95–110)
CHOLEST SERPL-MCNC: 143 MG/DL (ref 120–199)
CHOLEST/HDLC SERPL: 2.3 {RATIO} (ref 2–5)
CO2 SERPL-SCNC: 27 MMOL/L (ref 23–29)
CREAT SERPL-MCNC: 1 MG/DL (ref 0.5–1.4)
CREAT UR-MCNC: 297 MG/DL (ref 23–375)
CRP SERPL-MCNC: <0.29 MG/DL (ref 0–0.75)
EST. GFR  (NO RACE VARIABLE): >60 ML/MIN/1.73 M^2
ESTIMATED AVG GLUCOSE: 108 MG/DL (ref 68–131)
GLUCOSE SERPL-MCNC: 98 MG/DL (ref 70–110)
HBA1C MFR BLD: 5.4 % (ref 4–5.6)
HDLC SERPL-MCNC: 63 MG/DL (ref 40–75)
HDLC SERPL: 44.1 % (ref 20–50)
INSULIN COLLECTION INTERVAL: NORMAL
INSULIN SERPL-ACNC: 4.4 UU/ML
LDLC SERPL CALC-MCNC: 65.8 MG/DL (ref 63–159)
MICROALBUMIN UR DL<=1MG/L-MCNC: 21.7 MG/L
NONHDLC SERPL-MCNC: 80 MG/DL
POTASSIUM SERPL-SCNC: 3.9 MMOL/L (ref 3.5–5.1)
PROT SERPL-MCNC: 6.8 G/DL (ref 6–8.4)
SODIUM SERPL-SCNC: 140 MMOL/L (ref 136–145)
T3FREE SERPL-MCNC: 2.4 PG/ML (ref 2.3–4.2)
T4 FREE SERPL-MCNC: 1.08 NG/DL (ref 0.71–1.51)
TRIGL SERPL-MCNC: 71 MG/DL (ref 30–150)
TSH SERPL DL<=0.005 MIU/L-ACNC: 4.24 UIU/ML (ref 0.4–4)

## 2023-10-06 PROCEDURE — 82172 ASSAY OF APOLIPOPROTEIN: CPT | Performed by: INTERNAL MEDICINE

## 2023-10-06 PROCEDURE — 84443 ASSAY THYROID STIM HORMONE: CPT | Performed by: INTERNAL MEDICINE

## 2023-10-06 PROCEDURE — 83036 HEMOGLOBIN GLYCOSYLATED A1C: CPT | Performed by: INTERNAL MEDICINE

## 2023-10-06 PROCEDURE — 84439 ASSAY OF FREE THYROXINE: CPT | Performed by: INTERNAL MEDICINE

## 2023-10-06 PROCEDURE — 86140 C-REACTIVE PROTEIN: CPT | Performed by: INTERNAL MEDICINE

## 2023-10-06 PROCEDURE — 30000890 MISCELLANEOUS SENDOUT TEST, BLOOD: Performed by: INTERNAL MEDICINE

## 2023-10-06 PROCEDURE — 83525 ASSAY OF INSULIN: CPT | Performed by: INTERNAL MEDICINE

## 2023-10-06 PROCEDURE — 82542 COL CHROMOTOGRAPHY QUAL/QUAN: CPT | Performed by: INTERNAL MEDICINE

## 2023-10-06 PROCEDURE — 83698 ASSAY LIPOPROTEIN PLA2: CPT | Performed by: INTERNAL MEDICINE

## 2023-10-06 PROCEDURE — 36415 COLL VENOUS BLD VENIPUNCTURE: CPT | Performed by: INTERNAL MEDICINE

## 2023-10-06 PROCEDURE — 82043 UR ALBUMIN QUANTITATIVE: CPT | Performed by: INTERNAL MEDICINE

## 2023-10-06 PROCEDURE — 80061 LIPID PANEL: CPT | Performed by: INTERNAL MEDICINE

## 2023-10-06 PROCEDURE — 80053 COMPREHEN METABOLIC PANEL: CPT | Performed by: INTERNAL MEDICINE

## 2023-10-06 PROCEDURE — 84481 FREE ASSAY (FT-3): CPT | Performed by: INTERNAL MEDICINE

## 2023-10-07 LAB — APO B SERPL-MCNC: 68 MG/DL

## 2023-10-12 LAB — LP-PLA2 SERPL-CCNC: 106 NMOL/MIN/ML

## 2023-10-19 LAB
MISCELLANEOUS TEST NAME: NORMAL
REFERENCE LAB: NORMAL
SPECIMEN TYPE: NORMAL
TEST RESULT: NORMAL

## 2023-10-30 DIAGNOSIS — M25.512 LEFT SHOULDER PAIN: Primary | ICD-10-CM

## 2023-11-01 ENCOUNTER — HOSPITAL ENCOUNTER (OUTPATIENT)
Dept: RADIOLOGY | Facility: HOSPITAL | Age: 73
Discharge: HOME | End: 2023-11-01
Payer: MEDICARE

## 2023-11-01 DIAGNOSIS — M25.512 LEFT SHOULDER PAIN: ICD-10-CM

## 2023-11-01 PROCEDURE — 73221 MRI JOINT UPR EXTREM W/O DYE: CPT | Mod: TC,LT

## 2023-11-14 PROBLEM — R94.31 NONSPECIFIC ABNORMAL ELECTROCARDIOGRAM (ECG) (EKG): Status: ACTIVE | Noted: 2023-11-14

## 2023-11-27 ENCOUNTER — LAB VISIT (OUTPATIENT)
Dept: LAB | Facility: HOSPITAL | Age: 73
End: 2023-11-27
Attending: INTERNAL MEDICINE
Payer: MEDICARE

## 2023-11-27 DIAGNOSIS — E53.8 FOLATE DEFICIENCY: ICD-10-CM

## 2023-11-27 DIAGNOSIS — I34.0 MITRAL VALVE INSUFFICIENCY, UNSPECIFIED ETIOLOGY: ICD-10-CM

## 2023-11-27 DIAGNOSIS — I08.0 MITRAL VALVE INSUFFICIENCY AND AORTIC VALVE INSUFFICIENCY: ICD-10-CM

## 2023-11-27 DIAGNOSIS — I65.23 BILATERAL CAROTID ARTERY STENOSIS: ICD-10-CM

## 2023-11-27 DIAGNOSIS — E78.5 DYSLIPIDEMIA: ICD-10-CM

## 2023-11-27 DIAGNOSIS — E03.9 HYPOTHYROIDISM, UNSPECIFIED TYPE: ICD-10-CM

## 2023-11-27 DIAGNOSIS — Z79.899 ENCOUNTER FOR LONG-TERM (CURRENT) USE OF OTHER MEDICATIONS: ICD-10-CM

## 2023-11-27 LAB
BNP SERPL-MCNC: 78 PG/ML (ref 0–99)
MAGNESIUM SERPL-MCNC: 2.1 MG/DL (ref 1.6–2.6)
TROPONIN I SERPL DL<=0.01 NG/ML-MCNC: 9.5 PG/ML (ref 0–60)

## 2023-11-27 PROCEDURE — 83735 ASSAY OF MAGNESIUM: CPT | Mod: 91 | Performed by: INTERNAL MEDICINE

## 2023-11-27 PROCEDURE — 84484 ASSAY OF TROPONIN QUANT: CPT | Performed by: INTERNAL MEDICINE

## 2023-11-27 PROCEDURE — 36415 COLL VENOUS BLD VENIPUNCTURE: CPT | Performed by: INTERNAL MEDICINE

## 2023-11-27 PROCEDURE — 30000890 MISCELLANEOUS SENDOUT TEST, BLOOD: Performed by: INTERNAL MEDICINE

## 2023-11-27 PROCEDURE — 82542 COL CHROMOTOGRAPHY QUAL/QUAN: CPT | Performed by: INTERNAL MEDICINE

## 2023-11-27 PROCEDURE — 83880 ASSAY OF NATRIURETIC PEPTIDE: CPT | Performed by: INTERNAL MEDICINE

## 2023-11-27 PROCEDURE — 83735 ASSAY OF MAGNESIUM: CPT | Performed by: INTERNAL MEDICINE

## 2023-12-06 LAB — MAGNESIUM RBC-MCNC: 4.3 MG/DL (ref 4–6.4)

## 2023-12-08 LAB
MISCELLANEOUS TEST NAME: NORMAL
REFERENCE LAB: NORMAL
SPECIMEN TYPE: NORMAL
TEST RESULT: NORMAL

## 2024-02-08 DIAGNOSIS — I49.8 OTHER CARDIAC ARRHYTHMIA: Primary | ICD-10-CM

## 2024-02-16 ENCOUNTER — TELEPHONE (OUTPATIENT)
Dept: CARDIOLOGY | Facility: CLINIC | Age: 74
End: 2024-02-16
Payer: MEDICARE

## 2024-02-19 ENCOUNTER — OFFICE VISIT (OUTPATIENT)
Dept: ELECTROPHYSIOLOGY | Facility: CLINIC | Age: 74
End: 2024-02-19
Payer: MEDICARE

## 2024-02-19 ENCOUNTER — CLINICAL SUPPORT (OUTPATIENT)
Dept: CARDIOLOGY | Facility: HOSPITAL | Age: 74
End: 2024-02-19
Attending: STUDENT IN AN ORGANIZED HEALTH CARE EDUCATION/TRAINING PROGRAM
Payer: MEDICARE

## 2024-02-19 VITALS
BODY MASS INDEX: 23.47 KG/M2 | WEIGHT: 158.5 LBS | HEIGHT: 69 IN | SYSTOLIC BLOOD PRESSURE: 112 MMHG | DIASTOLIC BLOOD PRESSURE: 68 MMHG | HEART RATE: 48 BPM

## 2024-02-19 DIAGNOSIS — K21.9 GASTROESOPHAGEAL REFLUX DISEASE, UNSPECIFIED WHETHER ESOPHAGITIS PRESENT: ICD-10-CM

## 2024-02-19 DIAGNOSIS — R00.1 SINUS BRADYCARDIA: ICD-10-CM

## 2024-02-19 DIAGNOSIS — E78.2 MIXED HYPERLIPIDEMIA: ICD-10-CM

## 2024-02-19 DIAGNOSIS — K40.90 INGUINAL HERNIA WITHOUT OBSTRUCTION OR GANGRENE, RECURRENCE NOT SPECIFIED, UNSPECIFIED LATERALITY: Chronic | ICD-10-CM

## 2024-02-19 DIAGNOSIS — I49.8 OTHER CARDIAC ARRHYTHMIA: ICD-10-CM

## 2024-02-19 DIAGNOSIS — E03.9 ACQUIRED HYPOTHYROIDISM: ICD-10-CM

## 2024-02-19 DIAGNOSIS — I47.10 PSVT (PAROXYSMAL SUPRAVENTRICULAR TACHYCARDIA): ICD-10-CM

## 2024-02-19 DIAGNOSIS — I10 PRIMARY HYPERTENSION: ICD-10-CM

## 2024-02-19 DIAGNOSIS — I08.0 MITRAL VALVE INSUFFICIENCY AND AORTIC VALVE INSUFFICIENCY: ICD-10-CM

## 2024-02-19 DIAGNOSIS — R00.2 PALPITATIONS: ICD-10-CM

## 2024-02-19 DIAGNOSIS — I65.23 BILATERAL CAROTID ARTERY STENOSIS: ICD-10-CM

## 2024-02-19 DIAGNOSIS — R00.2 PALPITATIONS: Primary | ICD-10-CM

## 2024-02-19 PROBLEM — E78.5 DYSLIPIDEMIA: Status: RESOLVED | Noted: 2021-06-30 | Resolved: 2024-02-19

## 2024-02-19 PROBLEM — R94.31 NONSPECIFIC ABNORMAL ELECTROCARDIOGRAM (ECG) (EKG): Status: RESOLVED | Noted: 2023-11-14 | Resolved: 2024-02-19

## 2024-02-19 LAB
OHS QRS DURATION: 86 MS
OHS QTC CALCULATION: 380 MS

## 2024-02-19 PROCEDURE — 93248 EXT ECG>7D<15D REV&INTERPJ: CPT | Mod: ,,, | Performed by: STUDENT IN AN ORGANIZED HEALTH CARE EDUCATION/TRAINING PROGRAM

## 2024-02-19 PROCEDURE — 1101F PT FALLS ASSESS-DOCD LE1/YR: CPT | Mod: CPTII,S$GLB,, | Performed by: STUDENT IN AN ORGANIZED HEALTH CARE EDUCATION/TRAINING PROGRAM

## 2024-02-19 PROCEDURE — 93010 ELECTROCARDIOGRAM REPORT: CPT | Mod: S$GLB,,, | Performed by: INTERNAL MEDICINE

## 2024-02-19 PROCEDURE — 3008F BODY MASS INDEX DOCD: CPT | Mod: CPTII,S$GLB,, | Performed by: STUDENT IN AN ORGANIZED HEALTH CARE EDUCATION/TRAINING PROGRAM

## 2024-02-19 PROCEDURE — 1159F MED LIST DOCD IN RCRD: CPT | Mod: CPTII,S$GLB,, | Performed by: STUDENT IN AN ORGANIZED HEALTH CARE EDUCATION/TRAINING PROGRAM

## 2024-02-19 PROCEDURE — 3074F SYST BP LT 130 MM HG: CPT | Mod: CPTII,S$GLB,, | Performed by: STUDENT IN AN ORGANIZED HEALTH CARE EDUCATION/TRAINING PROGRAM

## 2024-02-19 PROCEDURE — 1126F AMNT PAIN NOTED NONE PRSNT: CPT | Mod: CPTII,S$GLB,, | Performed by: STUDENT IN AN ORGANIZED HEALTH CARE EDUCATION/TRAINING PROGRAM

## 2024-02-19 PROCEDURE — 99999 PR PBB SHADOW E&M-EST. PATIENT-LVL III: CPT | Mod: PBBFAC,,, | Performed by: STUDENT IN AN ORGANIZED HEALTH CARE EDUCATION/TRAINING PROGRAM

## 2024-02-19 PROCEDURE — 99205 OFFICE O/P NEW HI 60 MIN: CPT | Mod: S$GLB,,, | Performed by: STUDENT IN AN ORGANIZED HEALTH CARE EDUCATION/TRAINING PROGRAM

## 2024-02-19 PROCEDURE — 93005 ELECTROCARDIOGRAM TRACING: CPT | Mod: S$GLB,,, | Performed by: STUDENT IN AN ORGANIZED HEALTH CARE EDUCATION/TRAINING PROGRAM

## 2024-02-19 PROCEDURE — 3288F FALL RISK ASSESSMENT DOCD: CPT | Mod: CPTII,S$GLB,, | Performed by: STUDENT IN AN ORGANIZED HEALTH CARE EDUCATION/TRAINING PROGRAM

## 2024-02-19 PROCEDURE — 3078F DIAST BP <80 MM HG: CPT | Mod: CPTII,S$GLB,, | Performed by: STUDENT IN AN ORGANIZED HEALTH CARE EDUCATION/TRAINING PROGRAM

## 2024-02-19 PROCEDURE — 93246 EXT ECG>7D<15D RECORDING: CPT

## 2024-02-19 NOTE — PROGRESS NOTES
"Electrophysiology Clinic Note    Reason for new patient visit: Evaluation and recommendations regarding symptoms of palpitations.     PRESENTING HISTORY:     History of Present Illness:  Mr. Zak Larry is a trista 74-year-old gentleman who presents today for evaluation and recommendations regarding symptoms of palpitations. He has a past medical history significant for symptoms of intermittent palpitations, hypertension, hyperlipidemia, nonobstructive bilateral carotid artery stenosis, aortic sclerosis without stenosis, mild mitral regurgitation, moderate tricuspid regurgitation, a prior left acoustic neuroma with deafness of the left ear, a history of an inguinal hernia, GERD, and hypothyroidism.      He is followed in general cardiology with Dr. Delacruz and was recently seen on 12/19/2023. At that encounter, they discussed his symptoms of palpitations. These events are described as originating about 2 months ago, but he has reported symptoms of "irregular heart beats" for the past several years. He describes an event of racing heart rates as recorded by his home blood pressure cuff with a heart rate of 109 bpm. He has never been told that he has an arrhythmia. His palpitation symptoms are always very brief, lasting a matter of seconds to a minute, with no associated symptoms of chest pain or discomfort, shortness of breath, or lightheadedness or dizziness with these palpitations. He notes they tend to be more prominent during the evening, noted while lying in bed and trying to sleep, and denies any events that have awakened him from sleep. He denies any palpitations with exercise. He has tried to identify potential triggers, including caffeine - he is only drinking one cup of coffee in the morning, or potentially alcohol - he has rare social alcohol 1-2 per week. He denies any tobacco products. He does report occasional GERD symptoms; overall well-controled with omeprazole therapy.      In discussion with " Mr. Larry today, he denies any dizziness, lightheadedness, syncope or presyncope, current palpitations, chest pain or discomfort, shortness of breath, dyspnea on exertion, nausea, vomiting, lower extremity edema, orthopnea, or PND. He occasionally gets a little lightheaded when changing position suddenly from lying to standing or with quick head movements, but has not experienced these symptoms recently. He reports baseline very mild shortness of breath and dyspnea with exertion that has remained stable. He can climb one flight of stairs prior to needing to take a break and continues to attempt to increase his level of physical activity. He is able to run for several miles 3-4 times per week in addition to resistance and strength training at his gym with no reported limitations.     Review of Systems:  Review of Systems   Constitutional:  Negative for activity change.   HENT:  Positive for hearing loss. Negative for nosebleeds, postnasal drip, rhinorrhea, sinus pressure/congestion, sneezing and sore throat.         Deaf in his left ear.    Respiratory:  Positive for shortness of breath. Negative for apnea, cough, chest tightness and wheezing.    Cardiovascular:  Positive for palpitations. Negative for chest pain, leg swelling and claudication.   Gastrointestinal:  Positive for reflux. Negative for abdominal distention, abdominal pain, blood in stool, change in bowel habit, constipation, diarrhea, nausea and vomiting.   Genitourinary:  Negative for dysuria and hematuria.   Musculoskeletal:  Negative for arthralgias and gait problem.   Neurological:  Negative for dizziness, seizures, syncope, weakness, light-headedness, headaches and coordination difficulties.        PAST HISTORY:     Past Medical History:   Diagnosis Date    Acoustic neuroma     LEFT    Aortic valve insufficiency     Cancer     SKIN    Carotid artery stenosis     BILATERAL    COVID-19 01/26/2021    Dyslipidemia     Hearing loss     LEFT    Inguinal  "hernia 09/2021    Mitral valve insufficiency     Thyroid disease        Past Surgical History:   Procedure Laterality Date    BONE SPUR REMOVAL Left 2005    FOOT    EYE SURGERY      HEMORRHOID SURGERY      HERNIA REPAIR      INNER EAR SURGERY      ACOUSTIC NEUROMA    ROTATOR CUFF REPAIR      TONSILLECTOMY         Family History:  Family History   Problem Relation Age of Onset    Brain Hemorrhage Mother 68    No Known Problems Father        Social History:  He  reports that he has never smoked. He has never used smokeless tobacco. He reports current alcohol use. He reports that he does not use drugs.      MEDICATIONS & ALLERGIES:     Review of patient's allergies indicates:   Allergen Reactions    Oxycodone Itching       Current Outpatient Medications on File Prior to Visit   Medication Sig Dispense Refill    acebutoloL (SECTRAL) 200 MG capsule Take 1 capsule (200 mg total) by mouth once daily. 30 capsule 11    aspirin (ECOTRIN) 81 MG EC tablet Take 81 mg by mouth once daily. Take 1 tablet every other day.      cetirizine (ZYRTEC) 10 MG tablet Take 10 mg by mouth daily as needed.      ezetimibe (ZETIA) 10 mg tablet Take 1 tablet (10 mg total) by mouth once daily. 90 tablet 3    finasteride (PROSCAR) 5 mg tablet Take 5 mg by mouth once daily.      levothyroxine (SYNTHROID) 75 MCG tablet Take 1 tablet by mouth once daily. Take 1/2 tablet daily. 37.5mg      omega-3 fatty acids/fish oil (FISH OIL-OMEGA-3 FATTY ACIDS) 300-1,000 mg capsule Take 1 capsule by mouth once daily.       omeprazole (PRILOSEC) 40 MG capsule Take 40 mg by mouth once daily. Take 1 tablet every other day      rosuvastatin (CRESTOR) 10 MG tablet Take 1 tablet (10 mg total) by mouth once daily. 90 tablet 3    tamsulosin (FLOMAX) 0.4 mg Cap Take 1 capsule (0.4 mg total) by mouth once daily. 10 capsule 0     No current facility-administered medications on file prior to visit.        OBJECTIVE:     Vital Signs:  /68   Pulse (!) 48   Ht 5' 9" " (1.753 m)   Wt 71.9 kg (158 lb 8.2 oz)   BMI 23.41 kg/m²     Physical Exam:  Physical Exam  Constitutional:       General: He is not in acute distress.     Appearance: Normal appearance. He is not ill-appearing or diaphoretic.      Comments: Well-appearing man in NAD.   HENT:      Head: Normocephalic and atraumatic.      Nose: Nose normal.      Mouth/Throat:      Mouth: Mucous membranes are moist.      Pharynx: Oropharynx is clear.   Eyes:      Pupils: Pupils are equal, round, and reactive to light.   Cardiovascular:      Rate and Rhythm: Regular rhythm. Bradycardia present.      Pulses: Normal pulses.      Heart sounds: Normal heart sounds. No murmur heard.     No friction rub. No gallop.   Pulmonary:      Effort: Pulmonary effort is normal. No respiratory distress.      Breath sounds: Normal breath sounds. No wheezing, rhonchi or rales.   Chest:      Chest wall: No tenderness.   Abdominal:      General: There is no distension.      Palpations: Abdomen is soft.      Tenderness: There is no abdominal tenderness.   Musculoskeletal:         General: No swelling or tenderness.      Cervical back: Normal range of motion.      Right lower leg: No edema.      Left lower leg: No edema.   Skin:     General: Skin is warm and dry.      Findings: No erythema, lesion or rash.   Neurological:      General: No focal deficit present.      Mental Status: He is alert and oriented to person, place, and time. Mental status is at baseline.      Motor: No weakness.      Gait: Gait normal.   Psychiatric:         Mood and Affect: Mood normal.         Behavior: Behavior normal.        Laboratory Data:  Lab Results   Component Value Date    WBC 5.3 03/14/2023    HGB 13.1 03/14/2023    HCT 37.7 03/14/2023    MCV 92 03/14/2023     03/14/2023     Lab Results   Component Value Date    GLU 98 10/06/2023     10/06/2023    K 3.9 10/06/2023     10/06/2023    CO2 27 10/06/2023    BUN 24 (H) 10/06/2023    CREATININE 1.0  "10/06/2023    CALCIUM 8.3 (L) 10/06/2023    MG 2.1 11/27/2023     No results found for: "INR", "PROTIME"    Pertinent Cardiac Data:  ECG: Sinus bradycardia with rate of 48 bpm,  ms, QRS 86 ms, QT/QTc 426/380 ms.     Exercise Treadmill Cardiac Stress Test - 10/25/2017:  - Negative for ischemia     24-Hour Holter Monitor - 5/14/2018:  - NSR. Occasional VEs no V dysrhythmias. Rare SVEs, no AF nor SVT.     Resting 2D Transthoracic Echocardiogram - 3/29/2019:  - EF 58%. Aortic sclerosis. Moderate MR. Moderate TR.     Resting 2D Transthoracic Echocardiogram - 7/21/2021:  - EF 60%, No LVH, Normal filling pressures, Aortic sclerosis no stenosis, Mild MR, Moderate TR, PASP 31.2 mmHg     Bilateral Carotid Doppler Ultrasound - 8/29/2022:  - <50% stenosis of the ICA, bilaterally     Resting 2D Transthoracic Echocardiogram - 8/29/2022:  - EF 55%, No LVH or DD, Normal E/e', Mild BRITTANI, Aortic sclerosis, Mild MR, Mod TR, PASP 26.4 mmHg     24-Hour Holter Monitor - 12/5/2023:  - 32-117bpm; occasional VE no VTAC; 2 SVT salvos 138bpm - nonsustained atrial tachycardia       ASSESSMENT & PLAN:   Mr. Zak Larry is a trista 74-year-old gentleman who presents today for evaluation and recommendations regarding symptoms of palpitations. He has a past medical history significant for symptoms of intermittent palpitations, hypertension, hyperlipidemia, nonobstructive bilateral carotid artery stenosis, aortic sclerosis without stenosis, mild mitral regurgitation, moderate tricuspid regurgitation, a prior left acoustic neuroma with deafness of the left ear, a history of an inguinal hernia, GERD, and hypothyroidism. He has no abnormalities appreciated on physical exam or on in-office ECG, and is presently in asymptomatic bradycardia with a narrow intrinsic QRSd of 86ms.     - We discussed that his palpitations may indicate an arrhythmia, and that further evaluation is needed.  - We reviewed the results of his prior resting 2D " transthoracic echocardiogram that did not reveal any evidence of structural heart disease.   - We reviewed the results of his previous 24-hour monitor that did not reveal any sustained atrial or ventricular arrhythmias. There was no evidence of high-degree AV block, nor prolonged pauses.   - We will order a 14-day ambulatory event monitor in order to evaluate for a longer duration for any potential arrhythmias.  - His previously prescribed acebutolol 200mg po daily has been discontinued per Dr. Delacruz in the setting of asymptomatic bradycardia.   - We discussed lifestyle modification to mitigate his palpitations symptoms, including ensuring adequate hydration, stress management, appropriate sleep hygiene and encouragement for improved sleep, continued treatment of his GERD symptoms, and avoidance of potential triggers such as excessive alcohol or caffeine.     This patient will return to clinic with me in six months with a 14-day ambulatory event monitor in the interim. All questions and concerns were addressed in this visit.     Signing Physician:       ISABELLA Tomas MD  Electrophysiology Attending

## 2024-03-19 LAB
OHS CV HOLTER SINUS AVERAGE HR: 58
OHS CV HOLTER SINUS MAX HR: 132
OHS CV HOLTER SINUS MIN HR: 41

## 2024-04-11 ENCOUNTER — TELEPHONE (OUTPATIENT)
Dept: ELECTROPHYSIOLOGY | Facility: CLINIC | Age: 74
End: 2024-04-11
Payer: MEDICARE

## 2024-04-11 DIAGNOSIS — I47.10 PSVT (PAROXYSMAL SUPRAVENTRICULAR TACHYCARDIA): Primary | ICD-10-CM

## 2024-04-11 NOTE — TELEPHONE ENCOUNTER
----- Message from DARRIUS Tomas MD sent at 4/10/2024  1:35 PM CDT -----  Opal ARREGUIN,     Would you mind scheduling Mr. Larry back into clinic with me? He is scheduled in 6 months, but we saw atrial fibrillation on his monitor and I would like to discuss in clinic. Non-emergent established patient.     Thank you!    ----- Message -----  From: Tono Delacruz MD  Sent: 4/10/2024  10:48 AM CDT  To: DARRIUS Tomas MD    Patient had AF 9 hrs, 19%    Has fu appointment with Dr Tomas 6 months.  I can not see tracings.  Read report   [id number]

## 2024-05-03 ENCOUNTER — HOSPITAL ENCOUNTER (OUTPATIENT)
Dept: CARDIOLOGY | Facility: CLINIC | Age: 74
Discharge: HOME | End: 2024-05-03
Payer: MEDICARE

## 2024-05-03 ENCOUNTER — OFFICE VISIT (OUTPATIENT)
Dept: ELECTROPHYSIOLOGY | Facility: CLINIC | Age: 74
End: 2024-05-03
Payer: MEDICARE

## 2024-05-03 VITALS
DIASTOLIC BLOOD PRESSURE: 59 MMHG | BODY MASS INDEX: 23.47 KG/M2 | SYSTOLIC BLOOD PRESSURE: 100 MMHG | WEIGHT: 158.5 LBS | HEIGHT: 69 IN | HEART RATE: 69 BPM

## 2024-05-03 DIAGNOSIS — I65.23 BILATERAL CAROTID ARTERY STENOSIS: ICD-10-CM

## 2024-05-03 DIAGNOSIS — K40.90 INGUINAL HERNIA WITHOUT OBSTRUCTION OR GANGRENE, RECURRENCE NOT SPECIFIED, UNSPECIFIED LATERALITY: ICD-10-CM

## 2024-05-03 DIAGNOSIS — I10 PRIMARY HYPERTENSION: ICD-10-CM

## 2024-05-03 DIAGNOSIS — I47.10 PSVT (PAROXYSMAL SUPRAVENTRICULAR TACHYCARDIA): ICD-10-CM

## 2024-05-03 DIAGNOSIS — R00.1 SINUS BRADYCARDIA: ICD-10-CM

## 2024-05-03 DIAGNOSIS — I48.0 PAROXYSMAL ATRIAL FIBRILLATION: Primary | ICD-10-CM

## 2024-05-03 DIAGNOSIS — I08.0 MITRAL VALVE INSUFFICIENCY AND AORTIC VALVE INSUFFICIENCY: ICD-10-CM

## 2024-05-03 DIAGNOSIS — R00.2 PALPITATIONS: ICD-10-CM

## 2024-05-03 DIAGNOSIS — K21.9 GASTROESOPHAGEAL REFLUX DISEASE, UNSPECIFIED WHETHER ESOPHAGITIS PRESENT: ICD-10-CM

## 2024-05-03 DIAGNOSIS — E78.2 MIXED HYPERLIPIDEMIA: ICD-10-CM

## 2024-05-03 DIAGNOSIS — E03.9 ACQUIRED HYPOTHYROIDISM: ICD-10-CM

## 2024-05-03 LAB
OHS QRS DURATION: 82 MS
OHS QTC CALCULATION: 405 MS

## 2024-05-03 PROCEDURE — 99215 OFFICE O/P EST HI 40 MIN: CPT | Mod: S$GLB,,, | Performed by: STUDENT IN AN ORGANIZED HEALTH CARE EDUCATION/TRAINING PROGRAM

## 2024-05-03 PROCEDURE — 3074F SYST BP LT 130 MM HG: CPT | Mod: CPTII,S$GLB,, | Performed by: STUDENT IN AN ORGANIZED HEALTH CARE EDUCATION/TRAINING PROGRAM

## 2024-05-03 PROCEDURE — 93005 ELECTROCARDIOGRAM TRACING: CPT | Mod: S$GLB,,, | Performed by: STUDENT IN AN ORGANIZED HEALTH CARE EDUCATION/TRAINING PROGRAM

## 2024-05-03 PROCEDURE — 1101F PT FALLS ASSESS-DOCD LE1/YR: CPT | Mod: CPTII,S$GLB,, | Performed by: STUDENT IN AN ORGANIZED HEALTH CARE EDUCATION/TRAINING PROGRAM

## 2024-05-03 PROCEDURE — 1126F AMNT PAIN NOTED NONE PRSNT: CPT | Mod: CPTII,S$GLB,, | Performed by: STUDENT IN AN ORGANIZED HEALTH CARE EDUCATION/TRAINING PROGRAM

## 2024-05-03 PROCEDURE — 99999 PR PBB SHADOW E&M-EST. PATIENT-LVL III: CPT | Mod: PBBFAC,,, | Performed by: STUDENT IN AN ORGANIZED HEALTH CARE EDUCATION/TRAINING PROGRAM

## 2024-05-03 PROCEDURE — 3288F FALL RISK ASSESSMENT DOCD: CPT | Mod: CPTII,S$GLB,, | Performed by: STUDENT IN AN ORGANIZED HEALTH CARE EDUCATION/TRAINING PROGRAM

## 2024-05-03 PROCEDURE — 1159F MED LIST DOCD IN RCRD: CPT | Mod: CPTII,S$GLB,, | Performed by: STUDENT IN AN ORGANIZED HEALTH CARE EDUCATION/TRAINING PROGRAM

## 2024-05-03 PROCEDURE — 3078F DIAST BP <80 MM HG: CPT | Mod: CPTII,S$GLB,, | Performed by: STUDENT IN AN ORGANIZED HEALTH CARE EDUCATION/TRAINING PROGRAM

## 2024-05-03 PROCEDURE — 3008F BODY MASS INDEX DOCD: CPT | Mod: CPTII,S$GLB,, | Performed by: STUDENT IN AN ORGANIZED HEALTH CARE EDUCATION/TRAINING PROGRAM

## 2024-05-03 PROCEDURE — 93010 ELECTROCARDIOGRAM REPORT: CPT | Mod: S$GLB,,, | Performed by: INTERNAL MEDICINE

## 2024-05-03 RX ORDER — BISACODYL 5 MG/1
1 TABLET, COATED ORAL EVERY MORNING
COMMUNITY

## 2024-05-03 RX ORDER — LEVOTHYROXINE SODIUM 25 UG/1
25 TABLET ORAL EVERY MORNING
COMMUNITY

## 2024-05-03 RX ORDER — ATROPINE SULFATE 10 MG/ML
1 SOLUTION/ DROPS OPHTHALMIC 3 TIMES DAILY
COMMUNITY
End: 2024-05-05

## 2024-05-03 RX ORDER — CARBOXYMETHYLCELLULOSE SODIUM 5 MG/ML
1 SOLUTION/ DROPS OPHTHALMIC 3 TIMES DAILY PRN
COMMUNITY

## 2024-05-03 NOTE — PROGRESS NOTES
"Electrophysiology Clinic Note    Reason for follow-up patient visit: Ongoing evaluation and recommendations regarding symptoms of palpitations, with a new diagnosis of paroxysmal atrial fibrillation.     PRESENTING HISTORY:     History of Present Illness:  Mr. Zak Larry is a trista 74-year-old gentleman who returns to clinic today for ongoing evaluation and recommendations regarding symptoms of palpitations, with a new diagnosis of paroxysmal atrial fibrillation. 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      He is followed in general cardiology with Dr. Delacruz and was previously seen in clinic on 12/19/2023. At that encounter, they discussed his symptoms of palpitations. These events are described as originating about 2 months prior to his presentation, but he has reported symptoms of "irregular heart beats" for the past several years. He describes an event of racing heart rates as recorded by his home blood pressure cuff with a heart rate of 109 bpm. He had never been told that he has an arrhythmia prior to this encounter. His palpitation symptoms are always very brief, lasting a matter of seconds to a minute, with no associated symptoms of chest pain or discomfort, shortness of breath, or lightheadedness or dizziness with these palpitations. He notes they tend to be more prominent during the evening, noted while lying in bed and trying to sleep, and denies any events that have awakened him from sleep. He denies any palpitations with exercise. He has tried to identify potential triggers, including caffeine - he is only drinking one cup of " coffee in the morning, or potentially alcohol - he has rare social alcohol 1-2 per week. He denies any tobacco products. He does report occasional GERD symptoms; overall well-controled with omeprazole therapy. At our prior encounters, we obtained a 30-day ambulatory event monitor that revealed episodes of paroxysmal atrial fibrillation, at times with periods of rapid ventricular response. His longest event lasted 9 hours and 42 minutes, with an overall burden of atrial fibrillation of 19% on this monitor. He denies any symptoms during this event, and did not activate his symptom button during this episode.     In discussion with Mr. Larry today, he denies any dizziness, lightheadedness, syncope or presyncope, current palpitations, chest pain or discomfort, shortness of breath, dyspnea on exertion, nausea, vomiting, lower extremity edema, orthopnea, or PND. He occasionally gets a little lightheaded when changing position suddenly from lying to standing or with quick head movements, but has not experienced these symptoms recently. He reports baseline very mild shortness of breath and dyspnea with exertion that has remained stable. He can climb one flight of stairs prior to needing to take a break and continues to attempt to increase his level of physical activity. He is able to run for several miles 3-4 times per week in addition to resistance and strength training at his gym with no reported limitations.     Review of Systems:  Review of Systems   Constitutional:  Negative for activity change.   HENT:  Positive for hearing loss. Negative for nosebleeds, postnasal drip, rhinorrhea, sinus pressure/congestion, sneezing and sore throat.         Deaf in his left ear.    Respiratory:  Positive for shortness of breath. Negative for apnea, cough, chest tightness and wheezing.    Cardiovascular:  Positive for palpitations. Negative for chest pain, leg swelling and claudication.   Gastrointestinal:  Positive for reflux.  Negative for abdominal distention, abdominal pain, blood in stool, change in bowel habit, constipation, diarrhea, nausea and vomiting.   Genitourinary:  Negative for dysuria and hematuria.   Musculoskeletal:  Negative for arthralgias and gait problem.   Neurological:  Negative for dizziness, seizures, syncope, weakness, light-headedness, headaches and coordination difficulties.        PAST HISTORY:     Past Medical History:   Diagnosis Date    Acoustic neuroma     LEFT    Aortic valve insufficiency     Cancer     SKIN    Carotid artery stenosis     BILATERAL    COVID-19 01/26/2021    Dyslipidemia     Hearing loss     LEFT    Inguinal hernia 09/2021    Mitral valve insufficiency     Primary hypertension 2/19/2024    Thyroid disease        Past Surgical History:   Procedure Laterality Date    BONE SPUR REMOVAL Left 2005    FOOT    EYE SURGERY      HEMORRHOID SURGERY      HERNIA REPAIR      INNER EAR SURGERY      ACOUSTIC NEUROMA    ROTATOR CUFF REPAIR      TONSILLECTOMY         Family History:  Family History   Problem Relation Name Age of Onset    Brain Hemorrhage Mother  68    No Known Problems Father         Social History:  He  reports that he has never smoked. He has never used smokeless tobacco. He reports current alcohol use. He reports that he does not use drugs.      MEDICATIONS & ALLERGIES:     Review of patient's allergies indicates:   Allergen Reactions    Oxycodone Itching       Current Outpatient Medications on File Prior to Visit   Medication Sig Dispense Refill    acebutoloL (SECTRAL) 200 MG capsule Take 1 capsule (200 mg total) by mouth once daily. 30 capsule 11    aspirin (ECOTRIN) 81 MG EC tablet Take 81 mg by mouth once daily. Take 1 tablet every other day.      cetirizine (ZYRTEC) 10 MG tablet Take 10 mg by mouth daily as needed.      ezetimibe (ZETIA) 10 mg tablet Take 1 tablet (10 mg total) by mouth once daily. 90 tablet 3    finasteride (PROSCAR) 5 mg tablet Take 5 mg by mouth once daily.       "levothyroxine (SYNTHROID) 75 MCG tablet Take 1 tablet by mouth once daily. Take 1/2 tablet daily. 37.5mg      omega-3 fatty acids/fish oil (FISH OIL-OMEGA-3 FATTY ACIDS) 300-1,000 mg capsule Take 1 capsule by mouth once daily.       omeprazole (PRILOSEC) 40 MG capsule Take 40 mg by mouth once daily. Take 1 tablet every other day      rosuvastatin (CRESTOR) 10 MG tablet Take 1 tablet (10 mg total) by mouth once daily. 90 tablet 3    tamsulosin (FLOMAX) 0.4 mg Cap Take 1 capsule (0.4 mg total) by mouth once daily. 10 capsule 0     No current facility-administered medications on file prior to visit.        OBJECTIVE:     Vital Signs:  BP (!) 100/59   Pulse 69   Ht 5' 9" (1.753 m)   Wt 71.9 kg (158 lb 8.2 oz)   BMI 23.41 kg/m²     Physical Exam:  Physical Exam  Constitutional:       General: He is not in acute distress.     Appearance: Normal appearance. He is not ill-appearing or diaphoretic.      Comments: Well-appearing man in NAD.   HENT:      Head: Normocephalic and atraumatic.      Nose: Nose normal.      Mouth/Throat:      Mouth: Mucous membranes are moist.      Pharynx: Oropharynx is clear.   Eyes:      Pupils: Pupils are equal, round, and reactive to light.   Cardiovascular:      Rate and Rhythm: Normal rate. Rhythm irregular.      Pulses: Normal pulses.      Heart sounds: Normal heart sounds. No murmur heard.     No friction rub. No gallop.      Comments: Irregularly irregular rhythm on right radial artery palpation.   Pulmonary:      Effort: Pulmonary effort is normal. No respiratory distress.      Breath sounds: Normal breath sounds. No wheezing, rhonchi or rales.   Chest:      Chest wall: No tenderness.   Abdominal:      General: There is no distension.      Palpations: Abdomen is soft.      Tenderness: There is no abdominal tenderness.   Musculoskeletal:         General: No swelling or tenderness.      Cervical back: Normal range of motion.      Right lower leg: No edema.      Left lower leg: No edema. " "  Skin:     General: Skin is warm and dry.      Findings: No erythema, lesion or rash.   Neurological:      General: No focal deficit present.      Mental Status: He is alert and oriented to person, place, and time. Mental status is at baseline.      Motor: No weakness.      Gait: Gait normal.   Psychiatric:         Mood and Affect: Mood normal.         Behavior: Behavior normal.        Laboratory Data:  Lab Results   Component Value Date    WBC 5.3 03/14/2023    HGB 13.1 03/14/2023    HCT 37.7 03/14/2023    MCV 92 03/14/2023     03/14/2023     Lab Results   Component Value Date    GLU 98 10/06/2023     10/06/2023    K 3.9 10/06/2023     10/06/2023    CO2 27 10/06/2023    BUN 24 (H) 10/06/2023    CREATININE 1.0 10/06/2023    CALCIUM 8.3 (L) 10/06/2023    MG 2.1 11/27/2023     No results found for: "INR", "PROTIME"    Pertinent Cardiac Data:  ECG: Atrial fibrillation with a ventricular response rate of 69 bpm, QRS 82 ms, QT/QTc 378/405 ms.     Exercise Treadmill Cardiac Stress Test - 10/25/2017:  - Negative for ischemia     24-Hour Holter Monitor - 5/14/2018:  - NSR. Occasional VEs no V dysrhythmias. Rare SVEs, no AF nor SVT.     Resting 2D Transthoracic Echocardiogram - 3/29/2019:  - EF 58%. Aortic sclerosis. Moderate MR. Moderate TR.     Resting 2D Transthoracic Echocardiogram - 7/21/2021:  - EF 60%, No LVH, Normal filling pressures, Aortic sclerosis no stenosis, Mild MR, Moderate TR, PASP 31.2 mmHg     Bilateral Carotid Doppler Ultrasound - 8/29/2022:  - <50% stenosis of the ICA, bilaterally     Resting 2D Transthoracic Echocardiogram - 8/29/2022:  - EF 55%, No LVH or DD, Normal E/e', Mild BRITTANI, Aortic sclerosis, Mild MR, Mod TR, PASP 26.4 mmHg     24-Hour Holter Monitor - 12/5/2023:  - 32-117bpm; occasional VE no VTAC; 2 SVT salvos 138bpm - nonsustained atrial tachycardia     30-Day Ambulatory Event Monitor - 2/19/2024:    Baseline rhythm was sinus bradycardia with normal intervals and an " average heart rate of 58 bpm.    There were 13 patient-triggered episodes with reported symptoms of palpitations. These episodes corresponded to periods of sinus tachycardia and paroxysmal atrial fibrillation, at times with rapid ventricular response.    There were rare PVCs with an overall PVC burden of 0.15% (1.8K). There were rare PACs with an overall PAC burden of 0.32% (3.9K).    There were periods of atrial fibrillation, at times with rapid ventricular response. His overall burden of atrial fibrillation was 19%, with his longest event of atrial fibrillation lasting 9 hours and 42 minutes.      ASSESSMENT & PLAN:   Mr. Zak Larry is a trista 74-year-old gentleman who returns to clinic today for ongoing evaluation and recommendations regarding symptoms of palpitations, with a new diagnosis of paroxysmal atrial fibrillation. 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      - We discussed the pathophysiology of atrial fibrillation; specifically, we discussed paroxysmal atrial fibrillation and the concept that some patients may experience paroxysms of atrial fibrillation interrupting periods of sinus rhythm. We discussed that even a relatively low burden of atrial fibrillation continues to have an increased risk of CVA.   - He is presently in atrial fibrillation with excellent rate control. While in atrial fibrillation, he denies any overt symptoms. We discussed that paroxysmal atrial fibrillation may have brief events of atrial fibrillation with periods of sinus rhythm, and that he may spontaneously convert back into normal sinus rhythm. We  will repeat an ECG in one week to evaluate. Should he remain in atrial fibrillation, we discussed the indication to undergo a WALLY and cardioversion for restoration of sinus rhythm. We will await his upcoming ECG for a decision regarding possible WALLY/cardioversion.   - He has never had a trial of antiarrhythmic therapy. He has no history of underlying coronary artery disease, and the results of a prior exercise treadmill cardiac stress test did not reveal any evidence of ischemia, with no evidence of structural heart disease appreciated on echocardiogram.   - First line AAD for him would be a class Ic agent, such as flecainide or propafenone. Flecainide is preferred for initial therapy given its efficacy, favorable side effect profile, and ease of use. Recently obtained laboratory assessment did not reveal any contraindications to flecainide therapy, and this agent should not interact with his other medications. We will await his follow-up ECG, and should he continue to evidence periods of atrial fibrillation, we will plan to start flecainide 100mg po BID.    - Flecainide displays use-dependency, and jv blocking agents are generally jointly prescribed with class Ic agents. He is presently maintained on rate control with acebutolol 200mg po daily. We may consider modification of this agent to metoprolol in the future for cardioselectivity. He has periods of asymptomatic bradycardia that we will continue to monitor.   - His [id number] is 3 (HTN, carotid disease, male gender, age 65-74), portending an annual adjusted risk of CVA of 3.2%. We discussed the indication to start oral anticoagulation. We will begin rivaroxaban 20mg po daily. Prescription voucher cards were provided to assist with cost.   - We discussed the possibility of catheter ablation with pulmonary vein isolation should he continue to have symptoms and AF despite AAD therapy. He voices understanding, although he would like to continue to monitor at  this time, as he continues to have a relatively low burden of atrial fibrillation.   - Possible underlying drivers of atrial fibrillation were addressed at this appointment, including recommendations for maintenance of a healthy BMI - now a class I recommendation. Review of laboratory data revealed elevated TSH at 4.24, with FT4 within acceptable parameters at 2.4, indicative of subclinical hypothyroidism. He continues to closely follow with endocrine. A request for a sleep study was deferred today to evaluate for possible underlying obstructive sleep apnea; this may be reconsidered at a future encounter.      This patient will return to clinic in one week with a repeat ECG. Should he remain in atrial fibrillation, we will schedule a WALLY and cardioversion. We will consider initiation of antiarrhythmic therapy, and will initiate oral anticoagulation at this time. All questions and concerns were addressed in this visit.     Signing Physician:       ISABELLA Tomas MD  Electrophysiology Attending

## 2024-05-03 NOTE — Clinical Note
Opal Ramos,   This gentleman is in atrial fibrillation. I am repeating an ECG in one week, and if he remains in AF, let's plan a WALLY and cardioversion.   His ECG will be done closer to his home, so remind me to take a look once this pops up so we can get him set up for cardioversion if needed.  Thank you!

## 2024-05-05 PROBLEM — I48.0 PAROXYSMAL ATRIAL FIBRILLATION: Status: ACTIVE | Noted: 2024-05-05

## 2024-05-06 ENCOUNTER — TELEPHONE (OUTPATIENT)
Dept: ELECTROPHYSIOLOGY | Facility: CLINIC | Age: 74
End: 2024-05-06
Payer: MEDICARE

## 2024-05-06 NOTE — TELEPHONE ENCOUNTER
Spoke with patient regarding Xarelto 30 day free trial card. I have also spoken to pharmacist and given them ID# from one of our cards and they were able to reduce his Xarelto cost fro the 1st 30 days. He will let me know when he will be doing EKG at Dr Delacruz's office. Rachel Magaña RN    ----- Message from Dario Matamoros MA sent at 5/6/2024  2:44 PM CDT -----  Regarding: FW: Refill    ----- Message -----  From: Marva Briones  Sent: 5/6/2024  10:36 AM CDT  To: [facility] Santizo Staff  Subject: Refill                                           Patient calling to speak with staff regarding rivaroxaban (XARELTO) 20 mg Tab. He was told by manufactorythat xarelto was discontinued through patient but the doctor office can get it and may can send it to his pharmacy. Please call back @ [phone]. Thank you Marva

## 2024-05-07 ENCOUNTER — TELEPHONE (OUTPATIENT)
Dept: ELECTROPHYSIOLOGY | Facility: CLINIC | Age: 74
End: 2024-05-07
Payer: MEDICARE

## 2024-05-07 NOTE — TELEPHONE ENCOUNTER
Spoke with patient and Dr Delacruz's office and his EKG will be done on 5/9/24 and results will be available then. Rachel Magaña RN    ----- Message from Nicolle Mckeon MA sent at 5/7/2024  8:38 AM CDT -----  Rachel the patient would like to talk to you about a EKG that have to be schedule. Please call [phone]. Thank you.

## 2024-05-09 ENCOUNTER — TELEPHONE (OUTPATIENT)
Dept: ELECTROPHYSIOLOGY | Facility: CLINIC | Age: 74
End: 2024-05-09
Payer: MEDICARE

## 2024-05-14 ENCOUNTER — PATIENT MESSAGE (OUTPATIENT)
Dept: ELECTROPHYSIOLOGY | Facility: CLINIC | Age: 74
End: 2024-05-14
Payer: MEDICARE

## 2024-06-19 ENCOUNTER — LAB VISIT (OUTPATIENT)
Dept: LAB | Facility: HOSPITAL | Age: 74
End: 2024-06-19
Attending: INTERNAL MEDICINE
Payer: MEDICARE

## 2024-06-19 DIAGNOSIS — E03.9 HYPOTHYROIDISM, UNSPECIFIED TYPE: ICD-10-CM

## 2024-06-19 DIAGNOSIS — I34.0 MITRAL VALVE INSUFFICIENCY, UNSPECIFIED ETIOLOGY: ICD-10-CM

## 2024-06-19 DIAGNOSIS — E53.8 FOLATE DEFICIENCY: ICD-10-CM

## 2024-06-19 DIAGNOSIS — Z79.899 ENCOUNTER FOR LONG-TERM (CURRENT) USE OF OTHER MEDICATIONS: ICD-10-CM

## 2024-06-19 DIAGNOSIS — I08.0 MITRAL VALVE INSUFFICIENCY AND AORTIC VALVE INSUFFICIENCY: ICD-10-CM

## 2024-06-19 DIAGNOSIS — I65.23 BILATERAL CAROTID ARTERY STENOSIS: ICD-10-CM

## 2024-06-19 DIAGNOSIS — Z78.9 OTHER SPECIFIED HEALTH STATUS: ICD-10-CM

## 2024-06-19 DIAGNOSIS — E78.5 DYSLIPIDEMIA: ICD-10-CM

## 2024-06-19 LAB
ALBUMIN SERPL BCP-MCNC: 3.4 G/DL (ref 3.5–5.2)
ALBUMIN/CREAT UR: 3.4 UG/MG (ref 0–30)
ALP SERPL-CCNC: 62 U/L (ref 55–135)
ALT SERPL W/O P-5'-P-CCNC: 47 U/L (ref 10–44)
ANION GAP SERPL CALC-SCNC: 8 MMOL/L (ref 3–11)
AST SERPL-CCNC: 22 U/L (ref 10–40)
BILIRUB SERPL-MCNC: 0.7 MG/DL (ref 0.1–1)
BUN SERPL-MCNC: 24 MG/DL (ref 8–23)
CALCIUM SERPL-MCNC: 8.5 MG/DL (ref 8.7–10.5)
CHLORIDE SERPL-SCNC: 104 MMOL/L (ref 95–110)
CHOLEST SERPL-MCNC: 106 MG/DL (ref 120–199)
CHOLEST/HDLC SERPL: 1.9 {RATIO} (ref 2–5)
CO2 SERPL-SCNC: 27 MMOL/L (ref 23–29)
CREAT SERPL-MCNC: 1 MG/DL (ref 0.5–1.4)
CREAT UR-MCNC: 271 MG/DL (ref 23–375)
CRP SERPL-MCNC: <0.3 MG/L (ref 0–3.19)
EST. GFR  (NO RACE VARIABLE): >60 ML/MIN/1.73 M^2
ESTIMATED AVG GLUCOSE: 111 MG/DL (ref 68–131)
GLUCOSE SERPL-MCNC: 90 MG/DL (ref 70–110)
HBA1C MFR BLD: 5.5 % (ref 4–5.6)
HDLC SERPL-MCNC: 56 MG/DL (ref 40–75)
HDLC SERPL: 52.8 % (ref 20–50)
INSULIN COLLECTION INTERVAL: NORMAL
INSULIN SERPL-ACNC: 4.5 UU/ML
LDLC SERPL CALC-MCNC: 35.6 MG/DL (ref 63–159)
MICROALBUMIN UR DL<=1MG/L-MCNC: 9.2 MG/L
NONHDLC SERPL-MCNC: 50 MG/DL
POTASSIUM SERPL-SCNC: 3.8 MMOL/L (ref 3.5–5.1)
PROT SERPL-MCNC: 6.3 G/DL (ref 6–8.4)
SODIUM SERPL-SCNC: 139 MMOL/L (ref 136–145)
TRIGL SERPL-MCNC: 72 MG/DL (ref 30–150)

## 2024-06-19 PROCEDURE — 80061 LIPID PANEL: CPT | Performed by: INTERNAL MEDICINE

## 2024-06-19 PROCEDURE — 82172 ASSAY OF APOLIPOPROTEIN: CPT | Performed by: INTERNAL MEDICINE

## 2024-06-19 PROCEDURE — 36415 COLL VENOUS BLD VENIPUNCTURE: CPT | Performed by: INTERNAL MEDICINE

## 2024-06-19 PROCEDURE — 82570 ASSAY OF URINE CREATININE: CPT | Performed by: INTERNAL MEDICINE

## 2024-06-19 PROCEDURE — 83525 ASSAY OF INSULIN: CPT | Performed by: INTERNAL MEDICINE

## 2024-06-19 PROCEDURE — 86141 C-REACTIVE PROTEIN HS: CPT | Performed by: INTERNAL MEDICINE

## 2024-06-19 PROCEDURE — 83036 HEMOGLOBIN GLYCOSYLATED A1C: CPT | Performed by: INTERNAL MEDICINE

## 2024-06-19 PROCEDURE — 83698 ASSAY LIPOPROTEIN PLA2: CPT | Performed by: INTERNAL MEDICINE

## 2024-06-19 PROCEDURE — 80053 COMPREHEN METABOLIC PANEL: CPT | Performed by: INTERNAL MEDICINE

## 2024-06-21 LAB — APO B SERPL-MCNC: 54 MG/DL

## 2024-06-24 LAB — LP-PLA2 SERPL-CCNC: 74 NMOL/MIN/ML

## 2024-08-16 ENCOUNTER — TELEPHONE (OUTPATIENT)
Dept: ELECTROPHYSIOLOGY | Facility: CLINIC | Age: 74
End: 2024-08-16
Payer: MEDICARE

## 2024-08-16 NOTE — TELEPHONE ENCOUNTER
Spoke with patienrt regarding upcoming Lipo suction and Dr Tomas feels that it is safe for him to hold Xarelto for 2 days prior to surgical procedure. I have advised that his surgeon should decide when to restart the Xarelto post procedure. He voiced understanding.    ----- Message from Megan Soler sent at 8/15/2024  3:35 PM CDT -----  Regarding: surg clr  Pt is scheduled for lipo on 8/26/24 and needs to know if he can stop his Xarelto for 2-3 days.  He can be reached at [phone]    Thank you

## 2025-01-03 ENCOUNTER — LAB VISIT (OUTPATIENT)
Dept: LAB | Facility: HOSPITAL | Age: 75
End: 2025-01-03
Attending: INTERNAL MEDICINE
Payer: MEDICARE

## 2025-01-03 DIAGNOSIS — N13.8 BPH WITH OBSTRUCTION/LOWER URINARY TRACT SYMPTOMS: ICD-10-CM

## 2025-01-03 DIAGNOSIS — N40.1 BPH WITH OBSTRUCTION/LOWER URINARY TRACT SYMPTOMS: ICD-10-CM

## 2025-01-03 LAB
ALBUMIN SERPL BCP-MCNC: 3.6 G/DL (ref 3.5–5.2)
ALP SERPL-CCNC: 71 U/L (ref 55–135)
ALT SERPL W/O P-5'-P-CCNC: 55 U/L (ref 10–44)
ANION GAP SERPL CALC-SCNC: 4 MMOL/L (ref 3–11)
APTT PPP: 26.4 SEC (ref 21–32)
AST SERPL-CCNC: 28 U/L (ref 10–40)
BACTERIA #/AREA URNS HPF: NEGATIVE /HPF
BASOPHILS # BLD AUTO: 0.04 K/UL (ref 0–0.2)
BASOPHILS NFR BLD: 0.7 % (ref 0–1.9)
BILIRUB SERPL-MCNC: 0.6 MG/DL (ref 0.1–1)
BILIRUB UR QL STRIP: NEGATIVE
BUN SERPL-MCNC: 18 MG/DL (ref 8–23)
CALCIUM SERPL-MCNC: 9 MG/DL (ref 8.7–10.5)
CHLORIDE SERPL-SCNC: 105 MMOL/L (ref 95–110)
CLARITY UR: CLEAR
CO2 SERPL-SCNC: 30 MMOL/L (ref 23–29)
COLOR UR: YELLOW
CREAT SERPL-MCNC: 1 MG/DL (ref 0.5–1.4)
DIFFERENTIAL METHOD BLD: ABNORMAL
EOSINOPHIL # BLD AUTO: 0.2 K/UL (ref 0–0.5)
EOSINOPHIL NFR BLD: 3 % (ref 0–8)
ERYTHROCYTE [DISTWIDTH] IN BLOOD BY AUTOMATED COUNT: 12.4 % (ref 11.5–14.5)
EST. GFR  (NO RACE VARIABLE): >60 ML/MIN/1.73 M^2
GLUCOSE SERPL-MCNC: 95 MG/DL (ref 70–110)
GLUCOSE UR QL STRIP: NEGATIVE
HCT VFR BLD AUTO: 39.1 % (ref 40–54)
HGB BLD-MCNC: 13.2 G/DL (ref 14–18)
HGB UR QL STRIP: NEGATIVE
HYALINE CASTS #/AREA URNS LPF: 2.9 /LPF
IMM GRANULOCYTES # BLD AUTO: 0.01 K/UL (ref 0–0.04)
IMM GRANULOCYTES NFR BLD AUTO: 0.2 % (ref 0–0.5)
INR PPP: 1.1 (ref 0.8–1.2)
KETONES UR QL STRIP: NEGATIVE
LEUKOCYTE ESTERASE UR QL STRIP: ABNORMAL
LYMPHOCYTES # BLD AUTO: 2 K/UL (ref 1–4.8)
LYMPHOCYTES NFR BLD: 34.7 % (ref 18–48)
MCH RBC QN AUTO: 31.1 PG (ref 27–31)
MCHC RBC AUTO-ENTMCNC: 33.8 G/DL (ref 32–36)
MCV RBC AUTO: 92 FL (ref 82–98)
MICROSCOPIC COMMENT: ABNORMAL
MONOCYTES # BLD AUTO: 0.5 K/UL (ref 0.3–1)
MONOCYTES NFR BLD: 9.5 % (ref 4–15)
NEUTROPHILS # BLD AUTO: 3 K/UL (ref 1.8–7.7)
NEUTROPHILS NFR BLD: 51.9 % (ref 38–73)
NITRITE UR QL STRIP: NEGATIVE
NRBC BLD-RTO: 0 /100 WBC
PH UR STRIP: 6 [PH] (ref 5–8)
PLATELET # BLD AUTO: 163 K/UL (ref 150–450)
PMV BLD AUTO: 11.2 FL (ref 9.2–12.9)
POTASSIUM SERPL-SCNC: 4.4 MMOL/L (ref 3.5–5.1)
PROT SERPL-MCNC: 6.9 G/DL (ref 6–8.4)
PROT UR QL STRIP: NEGATIVE
PROTHROMBIN TIME: 11.9 SEC (ref 9–12.5)
RBC # BLD AUTO: 4.24 M/UL (ref 4.6–6.2)
RBC #/AREA URNS HPF: 2 /HPF (ref 0–4)
SODIUM SERPL-SCNC: 139 MMOL/L (ref 136–145)
SP GR UR STRIP: 1.02 (ref 1–1.03)
SQUAMOUS #/AREA URNS HPF: 1 /HPF
URN SPEC COLLECT METH UR: ABNORMAL
UROBILINOGEN UR STRIP-ACNC: NEGATIVE EU/DL
WBC # BLD AUTO: 5.68 K/UL (ref 3.9–12.7)
WBC #/AREA URNS HPF: 11 /HPF (ref 0–5)

## 2025-01-03 PROCEDURE — 85025 COMPLETE CBC W/AUTO DIFF WBC: CPT | Performed by: INTERNAL MEDICINE

## 2025-01-03 PROCEDURE — 80053 COMPREHEN METABOLIC PANEL: CPT | Performed by: INTERNAL MEDICINE

## 2025-01-03 PROCEDURE — 85610 PROTHROMBIN TIME: CPT | Mod: GA | Performed by: INTERNAL MEDICINE

## 2025-01-03 PROCEDURE — 81000 URINALYSIS NONAUTO W/SCOPE: CPT | Performed by: INTERNAL MEDICINE

## 2025-01-03 PROCEDURE — 36415 COLL VENOUS BLD VENIPUNCTURE: CPT | Performed by: INTERNAL MEDICINE

## 2025-01-03 PROCEDURE — 85730 THROMBOPLASTIN TIME PARTIAL: CPT | Mod: GA | Performed by: INTERNAL MEDICINE

## 2025-01-03 PROCEDURE — 87081 CULTURE SCREEN ONLY: CPT | Performed by: INTERNAL MEDICINE

## 2025-01-05 LAB — MRSA SPEC QL CULT: NORMAL

## 2025-05-05 ENCOUNTER — PATIENT MESSAGE (OUTPATIENT)
Dept: ELECTROPHYSIOLOGY | Facility: CLINIC | Age: 75
End: 2025-05-05
Payer: MEDICARE

## (undated) DEVICE — SUT GUT PL 3-0 CT 852H

## (undated) DEVICE — TRAY BASIC LAPAROTOMY 1

## (undated) DEVICE — COVER OVERHEAD SURG LT BLUE

## (undated) DEVICE — SUT 2/0 30IN SILK BLK BRAI

## (undated) DEVICE — SYR IRRIGATION BULB STER 60ML

## (undated) DEVICE — SUT J218H VICRYL 4-0

## (undated) DEVICE — LABEL BARKLEY 9/16X1-7/8IN

## (undated) DEVICE — SYR 30CC LUER LOCK

## (undated) DEVICE — SUT 3-0 VICRYL / SH (J416)

## (undated) DEVICE — GLOVE BIOGEL ECLIPSE SZ 7.5

## (undated) DEVICE — COVER WARMING BODY UPPER 78X21

## (undated) DEVICE — NDL 27G X 1 1/4

## (undated) DEVICE — SUT ETHIBOND 0 CR/MO-7 8-18

## (undated) DEVICE — APPLICATOR CHLORAPREP ORN 26ML

## (undated) DEVICE — Device

## (undated) DEVICE — CLIPPER BLADE MOD 4406 (CAREF)

## (undated) DEVICE — CAUTERY PUSHBUTTON PENCIL

## (undated) DEVICE — SUT SILK 3-0 BLK BR SH 30IN

## (undated) DEVICE — GLOVE BIOGEL ECLIPSE SZ 6.5

## (undated) DEVICE — DRAIN PENRS STERILE LTX 18X1/2

## (undated) DEVICE — UNDERPAD DISPOSABLE 30X30IN

## (undated) DEVICE — ELECTRODE REM PLYHSV RETURN 9

## (undated) DEVICE — SPONGE POST-OP GAUZE 4X4IN

## (undated) DEVICE — STAPLER INT HERNIA PROTACK 5MM

## (undated) DEVICE — SYR 10CC LUER LOCK

## (undated) DEVICE — CLEANER CAUT TIP STRL 2X2IN